# Patient Record
Sex: FEMALE | Race: WHITE | NOT HISPANIC OR LATINO | Employment: PART TIME | URBAN - METROPOLITAN AREA
[De-identification: names, ages, dates, MRNs, and addresses within clinical notes are randomized per-mention and may not be internally consistent; named-entity substitution may affect disease eponyms.]

---

## 2024-01-30 ENCOUNTER — OFFICE VISIT (OUTPATIENT)
Dept: INTERNAL MEDICINE | Facility: CLINIC | Age: 39
End: 2024-01-30
Payer: MEDICAID

## 2024-01-30 ENCOUNTER — TELEPHONE (OUTPATIENT)
Dept: INTERNAL MEDICINE | Facility: CLINIC | Age: 39
End: 2024-01-30

## 2024-01-30 ENCOUNTER — HOSPITAL ENCOUNTER (OUTPATIENT)
Dept: RADIOLOGY | Facility: HOSPITAL | Age: 39
Discharge: HOME | End: 2024-01-30
Attending: NURSE PRACTITIONER
Payer: MEDICAID

## 2024-01-30 VITALS
RESPIRATION RATE: 18 BRPM | TEMPERATURE: 99 F | SYSTOLIC BLOOD PRESSURE: 116 MMHG | HEIGHT: 56 IN | WEIGHT: 144.63 LBS | HEART RATE: 70 BPM | DIASTOLIC BLOOD PRESSURE: 54 MMHG | BODY MASS INDEX: 32.53 KG/M2

## 2024-01-30 DIAGNOSIS — Z87.81 HISTORY OF TIBIAL FRACTURE: ICD-10-CM

## 2024-01-30 DIAGNOSIS — M79.605 LEFT LEG PAIN: ICD-10-CM

## 2024-01-30 DIAGNOSIS — M79.605 LEFT LEG PAIN: Primary | ICD-10-CM

## 2024-01-30 DIAGNOSIS — Z12.4 CERVICAL CANCER SCREENING: ICD-10-CM

## 2024-01-30 DIAGNOSIS — F17.210 CIGARETTE NICOTINE DEPENDENCE WITHOUT COMPLICATION: Chronic | ICD-10-CM

## 2024-01-30 DIAGNOSIS — H92.01 RIGHT EAR PAIN: ICD-10-CM

## 2024-01-30 DIAGNOSIS — Z00.00 WELLNESS EXAMINATION: ICD-10-CM

## 2024-01-30 PROCEDURE — 1159F MED LIST DOCD IN RCRD: CPT | Mod: CPTII,,, | Performed by: NURSE PRACTITIONER

## 2024-01-30 PROCEDURE — 99203 OFFICE O/P NEW LOW 30 MIN: CPT | Mod: 25,S$PBB,, | Performed by: NURSE PRACTITIONER

## 2024-01-30 PROCEDURE — 3078F DIAST BP <80 MM HG: CPT | Mod: CPTII,,, | Performed by: NURSE PRACTITIONER

## 2024-01-30 PROCEDURE — 99406 BEHAV CHNG SMOKING 3-10 MIN: CPT | Mod: S$PBB,,, | Performed by: NURSE PRACTITIONER

## 2024-01-30 PROCEDURE — 73590 X-RAY EXAM OF LOWER LEG: CPT | Mod: TC,LT

## 2024-01-30 PROCEDURE — 3074F SYST BP LT 130 MM HG: CPT | Mod: CPTII,,, | Performed by: NURSE PRACTITIONER

## 2024-01-30 PROCEDURE — 1160F RVW MEDS BY RX/DR IN RCRD: CPT | Mod: CPTII,,, | Performed by: NURSE PRACTITIONER

## 2024-01-30 PROCEDURE — 3008F BODY MASS INDEX DOCD: CPT | Mod: CPTII,,, | Performed by: NURSE PRACTITIONER

## 2024-01-30 PROCEDURE — 99205 OFFICE O/P NEW HI 60 MIN: CPT | Mod: PBBFAC | Performed by: NURSE PRACTITIONER

## 2024-01-30 RX ORDER — BUSPIRONE HYDROCHLORIDE 7.5 MG/1
7.5 TABLET ORAL 2 TIMES DAILY
Status: ON HOLD | COMMUNITY
Start: 2024-01-25 | End: 2024-03-13 | Stop reason: HOSPADM

## 2024-01-30 RX ORDER — ATOMOXETINE 40 MG/1
40 CAPSULE ORAL
Status: ON HOLD | COMMUNITY
Start: 2024-01-25 | End: 2024-03-13 | Stop reason: HOSPADM

## 2024-01-30 RX ORDER — FLUTICASONE PROPIONATE 50 MCG
1 SPRAY, SUSPENSION (ML) NASAL
Status: ON HOLD | COMMUNITY
Start: 2024-01-25 | End: 2024-03-13 | Stop reason: HOSPADM

## 2024-01-30 RX ORDER — CITALOPRAM 20 MG/1
20 TABLET, FILM COATED ORAL
Status: ON HOLD | COMMUNITY
Start: 2024-01-25 | End: 2024-03-13 | Stop reason: HOSPADM

## 2024-01-30 NOTE — PROGRESS NOTES
Patient ID: [id number]     Chief Complaint: Establish Care (C/O right ear pain , burning for 1 month.)    HPI:     Mili Baker is a 38 y.o. female with diagnosis of Hx of Left Tibia Fx Repair with Jonathon/Screws (07/2023), Hx of Drug Use. Patient seen in clinic today to establish care.   Patient recently moved to [place] from [place]. Patient currently residing in a sober living home.   Patient had a left tibia fracture required jonathon/screw that was performed at [facility] in [place]. Patient has been experiencing swelling/pain to left leg x1 month. States takes Tylenol for pain with relief. Will try to obtain medical records from [facility].   Patient also states right ear pain. Patient denies drainage, change in hearing. Patient describes pain as sharp. Patient currently using Flonase with no relief.   Patient denies any other acute complaints.     Patient followed by Mental Health Provider, Juanis MENCHACA, at [place].     Review of patient's allergies indicates:  No Known Allergies    Breast Cancer Screening: deferred due to age  Cervical Cancer Screening: GYN referral ordered  Colorectal Cancer Screening: deferred due to age  Diabetic Eye Exam: N/A  Diabetic Foot Exam: N/A  Lung Cancer Screening: deferred due to age  Prostate Cancer Screening: N/A  AAA Screening: N/A  Osteoporosis Screening: deferred due to age  Medicare Wellness: N/A  Immunizations:   There is no immunization history on file for this patient.    Past Surgical History:   Procedure Laterality Date    leg Left      family history includes Asthma in her daughter and son; Kidney disease in her mother; No Known Problems in her father, maternal grandfather, maternal grandmother, paternal grandfather, and paternal grandmother.    Social History     Socioeconomic History    Marital status: Single    Number of children: 3   Tobacco Use    Smoking status: Every Day     Current packs/day: 0.50     Average packs/day: 0.5 packs/day for 20.1 years  (10.0 ttl pk-yrs)     Types: Cigarettes     Start date: 2004    Smokeless tobacco: Never   Substance and Sexual Activity    Alcohol use: Not Currently    Drug use: Not Currently     Types: Methamphetamines     Comment: Meth in past    Sexual activity: Not Currently     Partners: Male     Social Determinants of Health     Financial Resource Strain: High Risk (1/30/2024)    Overall Financial Resource Strain (CARDIA)     Difficulty of Paying Living Expenses: Very hard   Food Insecurity: No Food Insecurity (1/30/2024)    Hunger Vital Sign     Worried About Running Out of Food in the Last Year: Never true     Ran Out of Food in the Last Year: Never true   Transportation Needs: Unmet Transportation Needs (1/30/2024)    PRAPARE - Transportation     Lack of Transportation (Medical): Yes     Lack of Transportation (Non-Medical): Yes   Physical Activity: Inactive (1/30/2024)    Exercise Vital Sign     Days of Exercise per Week: 0 days     Minutes of Exercise per Session: 0 min   Stress: Stress Concern Present (1/30/2024)    [employer] [place] [employer] - Occupational Stress Questionnaire     Feeling of Stress : To some extent   Social Connections: Socially Isolated (1/30/2024)    Social Connection and Isolation Panel [NHANES]     Frequency of Communication with Friends and Family: Twice a week     Frequency of Social Gatherings with Friends and Family: Never     Attends [employer] Services: More than 4 times per year     Active Member of Clubs or Organizations: No     Attends Club or Organization Meetings: Never     Marital Status: Never    Housing Stability: High Risk (1/30/2024)    Housing Stability Vital Sign     Unable to Pay for Housing in the Last Year: Yes     Number of Places Lived in the Last Year: 2     Unstable Housing in the Last Year: No     Current Outpatient Medications   Medication Instructions    atomoxetine (STRATTERA) 40 mg, Oral    busPIRone (BUSPAR) 7.5 mg, Oral, 2 times daily     "citalopram (CELEXA) 20 mg, Oral    fluticasone propionate (FLONASE) 50 mcg/actuation nasal spray 1 spray, Each Nostril       Subjective:     Review of Systems   Constitutional: Negative.    HENT:  Positive for ear pain.    Eyes: Negative.    Respiratory: Negative.     Cardiovascular: Negative.    Gastrointestinal: Negative.    Endocrine: Negative.    Genitourinary: Negative.    Musculoskeletal:  Positive for arthralgias.   Skin: Negative.    Allergic/Immunologic: Negative.    Neurological: Negative.    Hematological: Negative.    Psychiatric/Behavioral: Negative.         Objective:     Visit Vitals  BP (!) 116/54 (BP Location: Right arm, Patient Position: Sitting, BP Method: Medium (Automatic))   Pulse 70   Temp 98.5 °F (36.9 °C) (Oral)   Resp 18   Ht 4' 8" (1.422 m)   Wt 65.6 kg (144 lb 9.6 oz)   BMI 32.42 kg/m²       Physical Exam  Vitals reviewed.   Constitutional:       Appearance: Normal appearance.   HENT:      Head: Normocephalic and atraumatic.      Right Ear: Tympanic membrane, ear canal and external ear normal. There is no impacted cerumen.      Left Ear: Tympanic membrane, ear canal and external ear normal. There is no impacted cerumen.      Mouth/Throat:      Mouth: Mucous membranes are moist.      Pharynx: Oropharynx is clear.   Eyes:      Extraocular Movements: Extraocular movements intact.      Conjunctiva/sclera: Conjunctivae normal.      Pupils: Pupils are equal, round, and reactive to light.   Cardiovascular:      Rate and Rhythm: Normal rate and regular rhythm.      Heart sounds: Normal heart sounds.   Pulmonary:      Effort: Pulmonary effort is normal.      Breath sounds: Normal breath sounds.   Abdominal:      General: Bowel sounds are normal.   Musculoskeletal:         General: Normal range of motion.      Cervical back: Normal range of motion.   Skin:     General: Skin is warm and dry.   Neurological:      Mental Status: She is alert and oriented to person, place, and time.   Psychiatric:    " "     Mood and Affect: Mood normal.         Behavior: Behavior normal.       Labs Reviewed:     Hematology:  No results found for: "WBC", "HGB", "HCT", "PLT"    Chemistry:  No results found for: "NA", "K", "CHLORIDE", "BUN", "CREATININE", "EGFRNORACEVR", "GLUCOSE", "CALCIUM", "ALKPHOS", "LABPROT", "ALBUMIN", "BILIDIR", "IBILI", "AST", "ALT", "MG", "PHOS", "[id number]"     No results found for: "HGBA1C", "MICROALBCREA"     Lipid Panel:  No results found for: "CHOL", "HDL", "LDL", "TRIG", "TOTALCHOLEST"     Thyroid:  No results found for: "TSH", "T4FREE", "T3FREE"     Urine:  No results found for: "COLORUA", "APPEARANCEUA", "SGUA", "PHUA", "PROTEINUA", "GLUCOSEUA", "KETONESUA", "BLOODUA", "NITRITESUA", "LEUKOCYTESUR", "RBCUA", "WBCUA", "BACTERIA", "SQEPUA", "HYALINECASTS", "CREATRANDUR", "PROTEINURINE", "UPROTCREA"     Assessment:       ICD-10-CM ICD-9-CM   1. Left leg pain  M79.605 729.5   2. History of tibial fracture  Z87.81 V15.51   3. Right ear pain  H92.01 388.70   4. Cigarette nicotine dependence without complication  F17.210 305.1   5. Wellness examination  Z00.00 V70.0   6. Cervical cancer screening  Z12.4 V76.2        Plan:     1. Left leg pain  - Ambulatory referral/consult to Orthopedics; Future  - X-Ray Tibia Fibula 2 View Left; Future    2. History of tibial fracture  - Ambulatory referral/consult to Orthopedics; Future  - X-Ray Tibia Fibula 2 View Left; Future    3. Right ear pain  - Ambulatory referral/consult to ENT; Future    4. Cigarette nicotine dependence without complication  Smoking cessation education provided, encouraged. Informed of smoking cessation program. Patient refused smoking cessation at this time. I spent 3 minutes discussing smoking cessation with patient.     5. Wellness examination  Labs ordered  - CBC Auto Differential; Future  - Comprehensive Metabolic Panel; Future  - Hemoglobin A1C; Future  - Lipid Panel; Future  - Urinalysis; Future  - TSH; Future    6. Cervical cancer " screening  - Ambulatory referral/consult to Gynecology; Future      Follow up in about 2 weeks (around 2/13/2024) for Labs. In addition to their scheduled follow up, the patient has also been instructed to follow up on as needed basis.     Vangie King, DENNISP

## 2024-01-30 NOTE — TELEPHONE ENCOUNTER
----- Message from NIKOLAI Dumas sent at 1/30/2024  8:47 AM CST -----  Please obtain medical records from [facility] (surgery for tibia fracture) in 07/2023 and Labs done at [facility] in [place], [place]. Release of info signed.

## 2024-03-09 ENCOUNTER — HOSPITAL ENCOUNTER (EMERGENCY)
Facility: HOSPITAL | Age: 39
Discharge: PSYCHIATRIC OR SUBSTANCE USE FACILITY | End: 2024-03-10
Attending: STUDENT IN AN ORGANIZED HEALTH CARE EDUCATION/TRAINING PROGRAM
Payer: MEDICAID

## 2024-03-09 DIAGNOSIS — T65.92XA INGESTION OF SUBSTANCE, INTENTIONAL SELF-HARM, INITIAL ENCOUNTER: ICD-10-CM

## 2024-03-09 DIAGNOSIS — R45.851 SUICIDAL IDEATION: ICD-10-CM

## 2024-03-09 DIAGNOSIS — Z00.8 MEDICAL CLEARANCE FOR PSYCHIATRIC ADMISSION: Primary | ICD-10-CM

## 2024-03-09 DIAGNOSIS — Z00.8 ENCOUNTER FOR PSYCHOLOGICAL EVALUATION: ICD-10-CM

## 2024-03-09 LAB
ALBUMIN SERPL-MCNC: 4.4 G/DL (ref 3.5–5)
ALBUMIN/GLOB SERPL: 1.6 RATIO (ref 1.1–2)
ALP SERPL-CCNC: 70 UNIT/L (ref 40–150)
ALT SERPL-CCNC: 13 UNIT/L (ref 0–55)
AMPHET UR QL SCN: NEGATIVE
APAP SERPL-MCNC: <17.4 UG/ML (ref 17.4–30)
APPEARANCE UR: ABNORMAL
AST SERPL-CCNC: 18 UNIT/L (ref 5–34)
B-HCG SERPL QL: NEGATIVE
BACTERIA #/AREA URNS AUTO: ABNORMAL /HPF
BARBITURATE SCN PRESENT UR: NEGATIVE
BASOPHILS # BLD AUTO: 0.13 X10(3)/MCL
BASOPHILS NFR BLD AUTO: 1.3 %
BENZODIAZ UR QL SCN: NEGATIVE
BILIRUB SERPL-MCNC: 0.4 MG/DL
BILIRUB UR QL STRIP.AUTO: NEGATIVE
BUN SERPL-MCNC: 5.8 MG/DL (ref 7–18.7)
CALCIUM SERPL-MCNC: 9 MG/DL (ref 8.4–10.2)
CANNABINOIDS UR QL SCN: NEGATIVE
CHLORIDE SERPL-SCNC: 102 MMOL/L (ref 98–107)
CO2 SERPL-SCNC: 21 MMOL/L (ref 22–29)
COCAINE UR QL SCN: NEGATIVE
COLOR UR AUTO: ABNORMAL
CREAT SERPL-MCNC: 0.67 MG/DL (ref 0.55–1.02)
EOSINOPHIL # BLD AUTO: 0.1 X10(3)/MCL (ref 0–0.9)
EOSINOPHIL NFR BLD AUTO: 1 %
ERYTHROCYTE [DISTWIDTH] IN BLOOD BY AUTOMATED COUNT: 12.5 % (ref 11.5–17)
ETHANOL SERPL-MCNC: <10 MG/DL
FENTANYL UR QL SCN: POSITIVE
GFR SERPLBLD CREATININE-BSD FMLA CKD-EPI: >60 MLS/MIN/1.73/M2
GLOBULIN SER-MCNC: 2.7 GM/DL (ref 2.4–3.5)
GLUCOSE SERPL-MCNC: 94 MG/DL (ref 74–100)
GLUCOSE UR QL STRIP.AUTO: NORMAL
HCT VFR BLD AUTO: 41.1 % (ref 37–47)
HGB BLD-MCNC: 14 G/DL (ref 12–16)
HYALINE CASTS #/AREA URNS LPF: ABNORMAL /LPF
IMM GRANULOCYTES # BLD AUTO: 0.05 X10(3)/MCL (ref 0–0.04)
IMM GRANULOCYTES NFR BLD AUTO: 0.5 %
KETONES UR QL STRIP.AUTO: NEGATIVE
LEUKOCYTE ESTERASE UR QL STRIP.AUTO: NEGATIVE
LYMPHOCYTES # BLD AUTO: 2.14 X10(3)/MCL (ref 0.6–4.6)
LYMPHOCYTES NFR BLD AUTO: 21 %
MAGNESIUM SERPL-MCNC: 1.7 MG/DL (ref 1.6–2.6)
MCH RBC QN AUTO: 31.9 PG (ref 27–31)
MCHC RBC AUTO-ENTMCNC: 34.1 G/DL (ref 33–36)
MCV RBC AUTO: 93.6 FL (ref 80–94)
MDMA UR QL SCN: NEGATIVE
MONOCYTES # BLD AUTO: 0.8 X10(3)/MCL (ref 0.1–1.3)
MONOCYTES NFR BLD AUTO: 7.8 %
MUCOUS THREADS URNS QL MICRO: ABNORMAL /LPF
NEUTROPHILS # BLD AUTO: 6.99 X10(3)/MCL (ref 2.1–9.2)
NEUTROPHILS NFR BLD AUTO: 68.4 %
NITRITE UR QL STRIP.AUTO: NEGATIVE
NRBC BLD AUTO-RTO: 0 %
OPIATES UR QL SCN: NEGATIVE
PCP UR QL: NEGATIVE
PH UR STRIP.AUTO: 5.5 [PH]
PH UR: 5.5 [PH] (ref 3–11)
PLATELET # BLD AUTO: 284 X10(3)/MCL (ref 130–400)
PMV BLD AUTO: 10.4 FL (ref 7.4–10.4)
POTASSIUM SERPL-SCNC: 4 MMOL/L (ref 3.5–5.1)
PROT SERPL-MCNC: 7.1 GM/DL (ref 6.4–8.3)
PROT UR QL STRIP.AUTO: ABNORMAL
RBC # BLD AUTO: 4.39 X10(6)/MCL (ref 4.2–5.4)
RBC #/AREA URNS AUTO: ABNORMAL /HPF
RBC UR QL AUTO: ABNORMAL
SARS-COV-2 RDRP RESP QL NAA+PROBE: NEGATIVE
SODIUM SERPL-SCNC: 136 MMOL/L (ref 136–145)
SP GR UR STRIP.AUTO: 1.01 (ref 1–1.03)
SPECIFIC GRAVITY, URINE AUTO (.000) (OHS): 1.01 (ref 1–1.03)
SQUAMOUS #/AREA URNS LPF: ABNORMAL /HPF
TSH SERPL-ACNC: 2.39 UIU/ML (ref 0.35–4.94)
UROBILINOGEN UR STRIP-ACNC: 2
WBC # SPEC AUTO: 10.21 X10(3)/MCL (ref 4.5–11.5)
WBC #/AREA URNS AUTO: ABNORMAL /HPF

## 2024-03-09 PROCEDURE — 80307 DRUG TEST PRSMV CHEM ANLYZR: CPT | Performed by: STUDENT IN AN ORGANIZED HEALTH CARE EDUCATION/TRAINING PROGRAM

## 2024-03-09 PROCEDURE — 25000003 PHARM REV CODE 250: Performed by: STUDENT IN AN ORGANIZED HEALTH CARE EDUCATION/TRAINING PROGRAM

## 2024-03-09 PROCEDURE — 63600175 PHARM REV CODE 636 W HCPCS: Performed by: STUDENT IN AN ORGANIZED HEALTH CARE EDUCATION/TRAINING PROGRAM

## 2024-03-09 PROCEDURE — 81025 URINE PREGNANCY TEST: CPT | Performed by: STUDENT IN AN ORGANIZED HEALTH CARE EDUCATION/TRAINING PROGRAM

## 2024-03-09 PROCEDURE — 81001 URINALYSIS AUTO W/SCOPE: CPT | Mod: XB | Performed by: STUDENT IN AN ORGANIZED HEALTH CARE EDUCATION/TRAINING PROGRAM

## 2024-03-09 PROCEDURE — 87635 SARS-COV-2 COVID-19 AMP PRB: CPT | Performed by: STUDENT IN AN ORGANIZED HEALTH CARE EDUCATION/TRAINING PROGRAM

## 2024-03-09 PROCEDURE — 99285 EMERGENCY DEPT VISIT HI MDM: CPT | Mod: 25

## 2024-03-09 PROCEDURE — 80053 COMPREHEN METABOLIC PANEL: CPT | Performed by: STUDENT IN AN ORGANIZED HEALTH CARE EDUCATION/TRAINING PROGRAM

## 2024-03-09 PROCEDURE — 80143 DRUG ASSAY ACETAMINOPHEN: CPT | Performed by: STUDENT IN AN ORGANIZED HEALTH CARE EDUCATION/TRAINING PROGRAM

## 2024-03-09 PROCEDURE — 82077 ASSAY SPEC XCP UR&BREATH IA: CPT | Performed by: STUDENT IN AN ORGANIZED HEALTH CARE EDUCATION/TRAINING PROGRAM

## 2024-03-09 PROCEDURE — 93005 ELECTROCARDIOGRAM TRACING: CPT

## 2024-03-09 PROCEDURE — 96366 THER/PROPH/DIAG IV INF ADDON: CPT

## 2024-03-09 PROCEDURE — 85025 COMPLETE CBC W/AUTO DIFF WBC: CPT | Performed by: STUDENT IN AN ORGANIZED HEALTH CARE EDUCATION/TRAINING PROGRAM

## 2024-03-09 PROCEDURE — 84443 ASSAY THYROID STIM HORMONE: CPT | Performed by: STUDENT IN AN ORGANIZED HEALTH CARE EDUCATION/TRAINING PROGRAM

## 2024-03-09 PROCEDURE — 96365 THER/PROPH/DIAG IV INF INIT: CPT

## 2024-03-09 PROCEDURE — 83735 ASSAY OF MAGNESIUM: CPT | Performed by: STUDENT IN AN ORGANIZED HEALTH CARE EDUCATION/TRAINING PROGRAM

## 2024-03-09 RX ORDER — MAGNESIUM SULFATE HEPTAHYDRATE 40 MG/ML
2 INJECTION, SOLUTION INTRAVENOUS
Status: COMPLETED | OUTPATIENT
Start: 2024-03-09 | End: 2024-03-09

## 2024-03-09 RX ADMIN — MAGNESIUM SULFATE HEPTAHYDRATE 2 G: 40 INJECTION, SOLUTION INTRAVENOUS at 05:03

## 2024-03-09 RX ADMIN — SODIUM CHLORIDE 1000 ML: 9 INJECTION, SOLUTION INTRAVENOUS at 06:03

## 2024-03-09 NOTE — ED PROVIDER NOTES
"Encounter Date: 3/9/2024    SCRIBE #1 NOTE: I, Lubna Reed, am scribing for, and in the presence of,  Carlitos Oswald MD. I have scribed the following portions of the note - Other sections scribed: HPI, ROS, PE, MDM.       History     Chief Complaint   Patient presents with    Ingestion     Pt. Reports taking 1-2 40 mg Strattera, 1-2 30mg Citalopram, 1-2 7.5mg Buspar, 1-2 150mg Trazodone 1 hour ago. Pt. Denies SI, however told EMS was trying to hurt herself "in a way." Awaiting admit to [facility].      Patient is a 38 year old female presents to ED, via EMS, for suicide ideation by attempted overdose.  Pt says that she took 1-3 Strattera, 1-2 trazodone, and 2 citalopram. She says that she wants to "just take my own self out." Denies any HI.    PEC initiated at 1445.    The history is provided by the patient. No  was used.     Review of patient's allergies indicates:  No Known Allergies  No past medical history on file.  Past Surgical History:   Procedure Laterality Date    leg Left      Family History   Problem Relation Age of Onset    Kidney disease Mother     No Known Problems Father     Asthma Daughter     Asthma Son     No Known Problems Maternal Grandmother     No Known Problems Maternal Grandfather     No Known Problems Paternal Grandmother     No Known Problems Paternal Grandfather      Social History     Tobacco Use    Smoking status: Every Day     Current packs/day: 0.50     Average packs/day: 0.5 packs/day for 20.2 years (10.1 ttl pk-yrs)     Types: Cigarettes     Start date: 2004    Smokeless tobacco: Never   Substance Use Topics    Alcohol use: Not Currently    Drug use: Not Currently     Types: Methamphetamines     Comment: Meth in past     Review of Systems   Constitutional:  Negative for fever.   HENT:  Negative for sore throat.    Eyes:  Negative for visual disturbance.   Respiratory:  Negative for shortness of breath.    Cardiovascular:  Negative for chest pain. "   Gastrointestinal:  Negative for abdominal pain.   Genitourinary:  Negative for dysuria.   Musculoskeletal:  Negative for joint swelling.   Skin:  Negative for rash.   Neurological:  Negative for weakness.   Psychiatric/Behavioral:  Positive for suicidal ideas. Negative for confusion.        Physical Exam     Initial Vitals [03/09/24 1410]   BP Pulse Resp Temp SpO2   117/76 76 16 98.4 °F (36.9 °C) 99 %      MAP       --         Physical Exam    Nursing note and vitals reviewed.  Constitutional: She appears well-developed and well-nourished.   HENT:   Head: Normocephalic and atraumatic.   Eyes: EOM are normal. Pupils are equal, round, and reactive to light.   Neck:   Normal range of motion.  Cardiovascular:  Normal rate, regular rhythm, normal heart sounds and intact distal pulses.           No murmur heard.  Pulmonary/Chest: Breath sounds normal. No respiratory distress. She has no wheezes. She has no rales.   Abdominal: Abdomen is soft. She exhibits no distension. There is no abdominal tenderness. There is no rebound.   Musculoskeletal:         General: No tenderness or edema. Normal range of motion.      Cervical back: Normal range of motion.     Neurological: She is alert. She has normal strength. No cranial nerve deficit. GCS score is 15. GCS eye subscore is 4. GCS verbal subscore is 5. GCS motor subscore is 6.   Skin: Skin is warm and dry. Capillary refill takes less than 2 seconds. No rash noted. No erythema.   Psychiatric: Her speech is delayed. She exhibits a depressed mood. She expresses suicidal ideation. She expresses no homicidal ideation. She expresses suicidal plans. She expresses no homicidal plans.         ED Course   Procedures  Labs Reviewed   COMPREHENSIVE METABOLIC PANEL - Abnormal; Notable for the following components:       Result Value    Carbon Dioxide 21 (*)     Blood Urea Nitrogen 5.8 (*)     All other components within normal limits   URINALYSIS, REFLEX TO URINE CULTURE - Abnormal;  Notable for the following components:    Appearance, UA Turbid (*)     Protein, UA Trace (*)     Blood, UA 2+ (*)     Urobilinogen, UA 2.0 (*)     Squamous Epithelial Cells, UA Occasional (*)     Mucous, UA Trace (*)     Hyaline Casts, UA 6-10 (*)     All other components within normal limits   DRUG SCREEN, URINE (BEAKER) - Abnormal; Notable for the following components:    Fentanyl, Urine Positive (*)     All other components within normal limits    Narrative:     Cut off concentrations:    Amphetamines - 1000 ng/ml  Barbiturates - 200 ng/ml  Benzodiazepine - 200 ng/ml  Cannabinoids (THC) - 50 ng/ml  Cocaine - 300 ng/ml  Fentanyl - 1.0 ng/ml  MDMA - 500 ng/ml  Opiates - 300 ng/ml   Phencyclidine (PCP) - 25 ng/ml    Specimen submitted for drug analysis and tested for pH and specific gravity in order to evaluate sample integrity. Suspect tampering if specific gravity is <1.003 and/or pH is not within the range of 4.5 - 8.0  False negatives may result form substances such as bleach added to urine.  False positives may result for the presence of a substance with similar chemical structure to the drug or its metabolite.    This test provides only a PRELIMINARY analytical test result. A more specific alternate chemical method must be used in order to obtain a confirmed analytical result. Gas chromatography/mass spectrometry (GC/MS) is the preferred confirmatory method. Other chemical confirmation methods are available. Clinical consideration and professional judgement should be applied to any drug of abuse test result, particularly when preliminary positive results are used.    Positive results will be confirmed only at the physicians request. Unconfirmed screening results are to be used only for medical purposes (treatment).        ACETAMINOPHEN LEVEL - Abnormal; Notable for the following components:    Acetaminophen Level <17.4 (*)     All other components within normal limits   CBC WITH DIFFERENTIAL - Abnormal;  Notable for the following components:    MCH 31.9 (*)     IG# 0.05 (*)     All other components within normal limits   TSH - Normal   ALCOHOL,MEDICAL (ETHANOL) - Normal   SARS-COV-2 RNA AMPLIFICATION, QUAL - Normal    Narrative:     The IDNOW COVID-19 assay is a rapid molecular in vitro diagnostic test utilizing an isothermal nucleic acid amplification technology intended for the qualitative detection of nucleic acid from the SARS-CoV-2 viral RNA in direct nasal, nasopharyngeal or throat swabs from individuals who are suspected of COVID-19 by their healthcare provider.   MAGNESIUM - Normal   CBC W/ AUTO DIFFERENTIAL    Narrative:     The following orders were created for panel order CBC auto differential.  Procedure                               Abnormality         Status                     ---------                               -----------         ------                     CBC with Differential[[id number]]       Abnormal            Final result                 Please view results for these tests on the individual orders.   SARS-COV-2 RNA AMPLIFICATION, QUAL     EKG Readings: (Independently Interpreted)   Normal sinus rhythm.  Rate of 97.  Prolonged QT with a QTC of 482.  No STEMI.  Time of 1644.        Imaging Results    None          Medications   magnesium sulfate 2g in water 50mL IVPB (premix) (2 g Intravenous New Bag 3/9/24 1756)   sodium chloride 0.9% bolus 1,000 mL 1,000 mL (1,000 mLs Intravenous New Bag 3/9/24 1812)     Medical Decision Making  Problems Addressed:  Encounter for psychological evaluation: acute illness or injury that poses a threat to life or bodily functions  Ingestion of substance, intentional self-harm, initial encounter: acute illness or injury that poses a threat to life or bodily functions  Medical clearance for psychiatric admission: acute illness or injury that poses a threat to life or bodily functions  Suicidal ideation: acute illness or injury that poses a threat to life or  bodily functions    Amount and/or Complexity of Data Reviewed  External Data Reviewed: notes.     Details: Reviewed old records, history of previous orthopedic injury  Labs: ordered.  ECG/medicine tests: ordered and independent interpretation performed.    Risk  Prescription drug management.  Parenteral controlled substances.  Decision regarding hospitalization.  Diagnosis or treatment significantly limited by social determinants of health.            Scribe Attestation:   Scribe #1: I performed the above scribed service and the documentation accurately describes the services I performed. I attest to the accuracy of the note.    Attending Attestation:           Physician Attestation for Scribe:  Physician Attestation Statement for Scribe #1: I, Carlitos Oswald MD, reviewed documentation, as scribed by Lubna Reed in my presence, and it is both accurate and complete.             ED Course as of 03/09/24 1932   Sat Mar 09, 2024   1900 Discussed with poison control.  [RP]      ED Course User Index  [RP] Carlitos Oswald MD       Medically cleared for psychiatry placement: 3/9/2024  7:30 PM       Medical Decision Making:   History:   I obtained history from: someone other than patient.       <> Summary of History: Collateral from paramedics.  Old Medical Records: I decided to obtain old medical records.  Old Records Summarized: records from clinic visits, records from previous admission(s) and records from another hospital.       <> Summary of Records: Reviewed old records.  Initial Assessment:   Psych eval  Differential Diagnosis:   suicidal ideations, homicidal ideations, auditory or visual hallucinations, drug/etoh intoxication, bipolar disorder, schizophrenia, schizoaffective, delusional disorder, major depressive disorder, PTSD, substance induced mood disorder, violent behavior, suicidal attempt, non-psychiatric medical illness, malingering      Clinical Tests:   Lab Tests: Reviewed and Ordered  Medical  Tests: Ordered and Reviewed  ED Management:  Patient is a 38-year-old female presents to the emergency department for intentional overdose, suicide ideation with attempt.  See HPI.  See physical exam.  Discussed case with poison control the patient's arrival.  She adjusted some oral medications.  She was observed for several hours for any agitation, seizure-like activity.  No evidence of agitation, tachycardic, seizure-like activity.  She was given IV fluids, magnesium, given that the medication she took can prolonged QTC.  On reassessment patient resting comfortably, no acute distress.  No tachycardic, diaphoresis, agitation noted.  Discussed with poison control, medically clear.  All results discussed with the patient.  Counseled extensively.  Patient has been placed under pec as she was a danger to herself.  Hemodynamically stable for transfer for continued psychiatric evaluation and care.             Clinical Impression:  Final diagnoses:  [Z00.8] Encounter for psychological evaluation  [Z00.8] Medical clearance for psychiatric admission (Primary)  [R45.851] Suicidal ideation  [T65.92XA] Ingestion of substance, intentional self-harm, initial encounter          ED Disposition Condition    Transfer to Psych Facility Stable          ED Prescriptions    None       Follow-up Information    None          Carlitos Oswald MD  03/09/24 1362

## 2024-03-10 ENCOUNTER — HOSPITAL ENCOUNTER (INPATIENT)
Facility: HOSPITAL | Age: 39
LOS: 4 days | Discharge: HOME | DRG: 885 | End: 2024-03-14
Attending: PSYCHIATRY & NEUROLOGY | Admitting: PSYCHIATRY & NEUROLOGY
Payer: MEDICAID

## 2024-03-10 VITALS
BODY MASS INDEX: 33.74 KG/M2 | HEART RATE: 87 BPM | WEIGHT: 150 LBS | HEIGHT: 56 IN | DIASTOLIC BLOOD PRESSURE: 58 MMHG | OXYGEN SATURATION: 96 % | TEMPERATURE: 99 F | RESPIRATION RATE: 16 BRPM | SYSTOLIC BLOOD PRESSURE: 119 MMHG

## 2024-03-10 DIAGNOSIS — F32.9 MDD (MAJOR DEPRESSIVE DISORDER): ICD-10-CM

## 2024-03-10 PROBLEM — R45.851 SUICIDAL IDEATION: Status: ACTIVE | Noted: 2024-03-10

## 2024-03-10 PROBLEM — F15.20 METHAMPHETAMINE USE DISORDER, SEVERE: Status: ACTIVE | Noted: 2024-03-10

## 2024-03-10 PROBLEM — F32.2 CURRENT SEVERE EPISODE OF MAJOR DEPRESSIVE DISORDER WITHOUT PSYCHOTIC FEATURES WITHOUT PRIOR EPISODE: Status: ACTIVE | Noted: 2024-03-10

## 2024-03-10 PROCEDURE — 11400000 HC PSYCH PRIVATE ROOM

## 2024-03-10 PROCEDURE — S4991 NICOTINE PATCH NONLEGEND: HCPCS | Performed by: PSYCHIATRY & NEUROLOGY

## 2024-03-10 PROCEDURE — 25000003 PHARM REV CODE 250: Performed by: PSYCHIATRY & NEUROLOGY

## 2024-03-10 RX ORDER — OLANZAPINE 10 MG/1
10 TABLET, ORALLY DISINTEGRATING ORAL EVERY 8 HOURS PRN
Status: DISCONTINUED | OUTPATIENT
Start: 2024-03-10 | End: 2024-03-14 | Stop reason: HOSPADM

## 2024-03-10 RX ORDER — IBUPROFEN 400 MG/1
400 TABLET ORAL EVERY 6 HOURS PRN
Status: DISCONTINUED | OUTPATIENT
Start: 2024-03-10 | End: 2024-03-14 | Stop reason: HOSPADM

## 2024-03-10 RX ORDER — CLONIDINE HYDROCHLORIDE 0.1 MG/1
0.1 TABLET ORAL NIGHTLY
Status: DISCONTINUED | OUTPATIENT
Start: 2024-03-10 | End: 2024-03-14 | Stop reason: HOSPADM

## 2024-03-10 RX ORDER — ALUMINUM HYDROXIDE, MAGNESIUM HYDROXIDE, AND SIMETHICONE 1200; 120; 1200 MG/30ML; MG/30ML; MG/30ML
30 SUSPENSION ORAL EVERY 6 HOURS PRN
Status: DISCONTINUED | OUTPATIENT
Start: 2024-03-10 | End: 2024-03-14 | Stop reason: HOSPADM

## 2024-03-10 RX ORDER — TRAZODONE HYDROCHLORIDE 100 MG/1
100 TABLET ORAL NIGHTLY
Status: ON HOLD | COMMUNITY
End: 2024-03-13 | Stop reason: HOSPADM

## 2024-03-10 RX ORDER — BUPROPION HYDROCHLORIDE 150 MG/1
150 TABLET ORAL DAILY
Status: DISCONTINUED | OUTPATIENT
Start: 2024-03-11 | End: 2024-03-14 | Stop reason: HOSPADM

## 2024-03-10 RX ORDER — BUSPIRONE HYDROCHLORIDE 5 MG/1
5 TABLET ORAL 3 TIMES DAILY
Status: DISCONTINUED | OUTPATIENT
Start: 2024-03-10 | End: 2024-03-12

## 2024-03-10 RX ORDER — FLUTICASONE PROPIONATE 50 MCG
1 SPRAY, SUSPENSION (ML) NASAL DAILY
Status: DISCONTINUED | OUTPATIENT
Start: 2024-03-11 | End: 2024-03-14 | Stop reason: HOSPADM

## 2024-03-10 RX ORDER — ACETAMINOPHEN 325 MG/1
650 TABLET ORAL EVERY 6 HOURS PRN
Status: DISCONTINUED | OUTPATIENT
Start: 2024-03-10 | End: 2024-03-14 | Stop reason: HOSPADM

## 2024-03-10 RX ORDER — IBUPROFEN 200 MG
1 TABLET ORAL DAILY
Status: DISCONTINUED | OUTPATIENT
Start: 2024-03-10 | End: 2024-03-14 | Stop reason: HOSPADM

## 2024-03-10 RX ORDER — HYDROXYZINE PAMOATE 25 MG/1
50 CAPSULE ORAL EVERY 6 HOURS PRN
Status: DISCONTINUED | OUTPATIENT
Start: 2024-03-10 | End: 2024-03-14 | Stop reason: HOSPADM

## 2024-03-10 RX ADMIN — BUSPIRONE HYDROCHLORIDE 5 MG: 5 TABLET ORAL at 03:03

## 2024-03-10 RX ADMIN — NICOTINE 1 PATCH: 21 PATCH, EXTENDED RELEASE TRANSDERMAL at 03:03

## 2024-03-10 RX ADMIN — OLANZAPINE 10 MG: 10 TABLET, ORALLY DISINTEGRATING ORAL at 03:03

## 2024-03-10 RX ADMIN — CLONIDINE HYDROCHLORIDE 0.1 MG: 0.1 TABLET ORAL at 08:03

## 2024-03-10 RX ADMIN — BUSPIRONE HYDROCHLORIDE 5 MG: 5 TABLET ORAL at 08:03

## 2024-03-10 NOTE — SUBJECTIVE & OBJECTIVE
Patient History               Medical as of 3/10/2024       Past Medical History       Diagnosis Date Comments Source    Alcohol abuse -- -- Provider    History of psychiatric hospitalization -- -- Provider    Hx of psychiatric care -- -- Provider                          Surgical as of 3/10/2024       Past Surgical History       Procedure Laterality Date Comments Source    leg [Other] Left -- -- Provider                          Family as of 3/10/2024       Problem Relation Name Age of Onset Comments Source    Kidney disease Mother -- -- -- Provider    No Known Problems Father -- -- -- Provider    Asthma Daughter -- -- -- Provider    Asthma Son -- -- -- Provider    No Known Problems Maternal Grandmother -- -- -- Provider    No Known Problems Maternal Grandfather -- -- -- Provider    No Known Problems Paternal Grandmother -- -- -- Provider    No Known Problems Paternal Grandfather -- -- -- Provider                  Tobacco Use as of 3/10/2024       Smoking Status Smoking Start Date Last Attempt to Quit Current Packs/Day Average Packs/Day    Every Day 2004 -- 0.5 0.5 packs/day for 20.2 years (10.1 ttl pk-yrs)   Pack Year History   Packs/Day From To Years    0.5 2004 -- 20.2      Smokeless Status Smokeless Type Smokeless Quit Date    Never -- --      Source    Provider                  Alcohol Use as of 3/10/2024       Alcohol Use Drinks/Week Alcohol/Week Comments Source    Not Currently   -- -- Provider                  Drug Use as of 3/10/2024       Drug Use Types Frequency Comments Source    Not Currently  Methamphetamines -- Meth in past Provider                  Sexual Activity as of 3/10/2024       Sexually Active Birth Control Partners Comments Source    Not Currently -- Male -- Provider                  Activities of Daily Living as of 3/10/2024    None               Social Documentation as of 3/10/2024    Patient currently lives in a sober living home in [facility] with tentative individuals.   She states she does have a warrant for her arrest and he was [place] for a unpaid traffic find.    Patient has previously been incarcerated.    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  She does not want to return to school she was 3 children all in the custody of her mother.  Her family those in [facility].  Source: Provider               Occupational as of 3/10/2024    None               Socioeconomic as of 3/10/2024       Marital Status Spouse Name Number of Children Years Education Education Level Preferred Language Ethnicity Race Source    Single -- 3 -- -- English Not  or /a White Provider                  Pertinent History       Question Response Comments    Lives with -- --    Place in Birth Order -- --    Lives in other --    Number of Siblings -- --    Raised by biological parents --    Legal Involvement criminal litigation --    Childhood Trauma uneventful --    Criminal History of arrest and incarceration --    Financial Status employed --    Highest Level of Education unfinished college --    Does patient have access to a firearm? No --     Service No --    Primary Leisure Activity -- --    Spirituality -- --          Past Medical History:   Diagnosis Date    Alcohol abuse     History of psychiatric hospitalization     Hx of psychiatric care      Past Surgical History:   Procedure Laterality Date    leg Left      Family History       Problem Relation (Age of Onset)    Asthma Daughter, Son    Kidney disease Mother    No Known Problems Father, Maternal Grandmother, Maternal Grandfather, Paternal Grandmother, Paternal Grandfather          Tobacco Use    Smoking status: Every Day     Current packs/day: 0.50     Average packs/day: 0.5 packs/day for 20.2 years (10.1 ttl pk-yrs)     Types: Cigarettes     Start date: 2004    Smokeless tobacco: Never   Substance and Sexual Activity    Alcohol use: Not Currently    Drug use: Not Currently      Types: Methamphetamines     Comment: Meth in past    Sexual activity: Not Currently     Partners: Male     Review of patient's allergies indicates:  No Known Allergies    Current Facility-Administered Medications on File Prior to Encounter   Medication    [COMPLETED] magnesium sulfate 2g in water 50mL IVPB (premix)    [COMPLETED] sodium chloride 0.9% bolus 1,000 mL 1,000 mL     Current Outpatient Medications on File Prior to Encounter   Medication Sig    atomoxetine (STRATTERA) 40 MG capsule Take 40 mg by mouth.    busPIRone (BUSPAR) 7.5 MG tablet Take 7.5 mg by mouth 2 (two) times daily.    citalopram (CELEXA) 20 MG tablet Take 20 mg by mouth.    fluticasone propionate (FLONASE) 50 mcg/actuation nasal spray 1 spray by Each Nostril route.    traZODone (DESYREL) 100 MG tablet Take 100 mg by mouth every evening.     Psychotherapeutics (From admission, onward)      Start     Stop Route Frequency Ordered    03/11/24 0900  buPROPion TB24 tablet 150 mg         -- Oral Daily 03/10/24 1318    03/10/24 1500  busPIRone tablet 5 mg         -- Oral 3 times daily 03/10/24 1318    03/10/24 0343  OLANZapine zydis disintegrating tablet 10 mg         -- Oral Every 8 hours PRN 03/10/24 0343          Review of Systems   Psychiatric/Behavioral:  Positive for decreased concentration, dysphoric mood, sleep disturbance and suicidal ideas. The patient is nervous/anxious.    Strengths and Liabilities: Strength: Patient is expressive/articulate., Strength: Patient is motivated for change., Liability: Patient has no suport network.    Objective:     Vital Signs (Most Recent):  Temp: 98.2 °F (36.8 °C) (03/10/24 0706)  Pulse: 95 (03/10/24 0706)  Resp: 18 (03/10/24 0706)  BP: 106/66 (03/10/24 0706)  SpO2: 97 % (03/10/24 0706) Vital Signs (24h Range):  Temp:  [98.2 °F (36.8 °C)-98.5 °F (36.9 °C)] 98.2 °F (36.8 °C)  Pulse:  [] 95  Resp:  [12-20] 18  SpO2:  [94 %-98 %] 97 %  BP: ()/(55-66) 106/66           There is no height or weight  on file to calculate BMI.    No intake or output data in the 24 hours ending 03/10/24 1610       Physical Exam   Mental status examination:  38-year-old white female whose affect at this time was constricted.  Whose speech was with good articulation and execution.  His thought processes this time were linear and able to be tracked.  His thought content demonstrated no clear evidence of any auditory or visual hallucinations.  She made no statements to harm self.  She made no active statements to harm self but clearly had previous gestures and attempts to do so.  She did not show any evidence of auditory or visual hallucinations.  She was preoccupied with previous events in life events and past wrongs.  She had no current statements desires to harm others.  Her level intensity of paranoia was non-existent.  Insight and judgment deemed to be limited.  On cognitive evaluation she was alert and oriented to person, place, setting and time.  Immediate memory is 3/3 objects immediately.  2/3 objects 5 minutes.  Long-term memory appeared to be intact.  She can perform basic calculations serial threes.  Her fund of knowledge is deemed to be slightly above average.  She was adequately abstract.     Significant Labs: Last 72 Hours:   Recent Lab Results         03/09/24  1617   03/09/24  1509   03/09/24  1441        Phencyclidine     Negative       Albumin/Globulin Ratio   1.6         Acetaminophen Level   <17.4         Albumin   4.4         Alcohol, Serum   <10.0  Comment: This assay is performed for medical purposes only.         ALP   70         ALT   13         Amphetamines, Urine     Negative       Appearance, UA     Turbid       AST   18         Bacteria, UA     Trace       Barbituates, Urine     Negative       Baso #   0.13         Basophil %   1.3         Benzodiazepine, Urine     Negative       BILIRUBIN TOTAL   0.4         Bilirubin, UA     Negative       BUN   5.8         Calcium   9.0         Cannabinoids, Urine      Negative       Chloride   102         CO2   21         Cocaine, Urine     Negative       Color, UA     Light-Yellow       ID NOW COVID-19, (SENG) Negative           Creatinine   0.67         eGFR   >60         Eos #   0.10         Eos %   1.0         Fentanyl, Urine     Positive       Globulin, Total   2.7         Glucose   94         Glucose, UA     Normal       Hematocrit   41.1         Hemoglobin   14.0         Hyaline Casts, UA     6-10       Immature Grans (Abs)   0.05         Immature Granulocytes   0.5         Ketones, UA     Negative       Leukocyte Esterase, UA     Negative       Lymph #   2.14         LYMPH %   21.0         Magnesium    1.70         MCH   31.9         MCHC   34.1         MCV   93.6         MDMA, Urine     Negative       Mono #   0.80         Mono %   7.8         MPV   10.4         Mucous, UA     Trace       Neut #   6.99         Neut %   68.4         NITRITE UA     Negative       nRBC   0.0         Blood, UA     2+       Opiates, Urine     Negative       pH, UA     5.5       pH, Urine     5.5       Platelet Count   284         Potassium   4.0         hCG Qualitative, Urine     Negative       PROTEIN TOTAL   7.1         Protein, UA     Trace       RBC   4.39         RBC, UA     0-5       RDW   12.5         Sodium   136         Specific Gravity,UA     1.014       Specific Gravity, Urine Auto     1.014       Squamous Epithelial Cells, UA     Occasional       TSH   2.391         Urobilinogen, UA     2.0       WBC, UA     0-5       WBC   10.21                 Significant Imaging: None

## 2024-03-10 NOTE — ASSESSMENT & PLAN NOTE
Patient this time will be seen by this provider in terms of full evaluation for changes in current trials history of inattentiveness and low energy levels we will consider trials of Wellbutrin move away from trials of Celexa.  We will also go ahead and place patient on trials of Catapres for management overall sleep disturbance.  Patient this time clearly need of additional individuals to foster stabilization.  In conjunction with the use of the Catapres also will look to increase doses of BuSpar to 5 mg p.o. t.i.d. we will reassess.  Patient was clearly need ongoing services to foster recovery and stabilization..

## 2024-03-10 NOTE — NURSING
"Admission Note:    Mili Baker is a 38 y.o. female, : 1985, MRN: [id number], admitted on (Not on file) to [facility]) for Shiva Patterson MD with a diagnosis of MDD with SI  Opiod Use Disorder. Patient admitted on a status of Physician Emergency Certificate (PEC). Mili reports no known food or drug allergies.    Patient demonstrated an affect that was anxious, irritable, agitated, angry, and expansive. Patient demonstrated mood during assessment that was anxious. Patient had an appearance that was disheveled.  Patient endorses suicidal ideation. Patient endorses suicide plan. Patient endorses hallucinations.     Patient presented to the ER for ingestion of 1-2 40mg Strattera, 1-2 30mg Citalopram, 1-2 7.5mg Buspar, 1-2 150mg Trazadone,  1 hour prior to ER arrival. She stated that she wants to "just take my self out". (Per ER).   On arrival to [facility] patient very agitated and uncooperative this admission. Patient demanding to smoke, loud, sarcastic, hospital security called.  "You violated my federal rights not to smoke".  Patient signed "Theodore Cheese" on admission papers.  Staff asked her when was the last time she took her medication and she stated, "It's your job to figure it out."  Denied suicidal thoughts.  Stated she hears voices, "I hear myself in my on head, repetitious thoughts."   UDS: +Fentanyl  Security search completed.   Hx: Depression, ADHD.  Patient fed, then went to her room.  Q 15 minute safety checks initiated.       Metal detector screening performed via security personnel. The result of the scan was negative_______. Head-to-toe physical assessment completed with the following findings:  Nothing________ found upon body screen. A full skin assessment was performed. Justin skin appeared _WNL______.  Mili was oriented to unit, staff, peers, and room. Patient belongings/valuables stored in locked intake room cabinet and changes of clothing provided to " patient. Mili was placed on Q 15 min observations.

## 2024-03-10 NOTE — NURSING
Daily Nursing Note:      Behavior:    Patient (Mili Baker is a 38 y.o. female, : 1985, MRN: [id number]) demonstrating an affect that was sad, flat, and anxious. Mili demonstrating mood that is depressed and anxious. Mili had an appearance that was disheveled. Mili denies suicidal ideation. Mili denies suicide plan. Mili denies homicidal ideation. Mili denies hallucinations.    Mili's  temperature is 98.2 °F (36.8 °C). Her blood pressure is 106/66 and her pulse is 95. Her respiration is 18 and oxygen saturation is 97%.       Intervention:    Encourage Mili to perform self-hygiene, grooming, and changing of clothing. Monitor Mili's behavior and program compliance. Monitor Mili for suicidal ideation, homicidal ideation, sleep disturbance, and hallucinations. Encourage Mili to eat all portions of meals and assess for meal preferences. Monitor Mili for intake and output to ensure hydration. Notify the Physician for any medication refusal and any change in patient condition.      Response:    Mili verbalizes understands unit process and procedures. She has been somnolent most of the day after receiving Zyprexa Zydis 10mg @ 03:40 this morning for increased anxiety and agitation. She refused breakfast and lunch, but did eat 100% of 14:00 snack. She met with Dr. Patterson this afternoon for psyc evaluation.     Plan:     Continue to monitor per MD orders; maintain patient safety. Q 15 min safety checks with suicide precautions.

## 2024-03-10 NOTE — PLAN OF CARE
Problem: Adult Inpatient Plan of Care  Goal: Plan of Care Review  Outcome: Ongoing, Progressing  Goal: Patient-Specific Goal (Individualized)  Outcome: Ongoing, Progressing  Goal: Absence of Hospital-Acquired Illness or Injury  Outcome: Ongoing, Progressing  Goal: Optimal Comfort and Wellbeing  Outcome: Ongoing, Progressing  Goal: Readiness for Transition of Care  Outcome: Ongoing, Progressing     Problem: Suicide Risk  Goal: Absence of Self-Harm  Outcome: Ongoing, Progressing

## 2024-03-10 NOTE — ED NOTES
Received a call from [place], placement at [facility], accepted by Dr. Patterson [phone]

## 2024-03-10 NOTE — SUBJECTIVE & OBJECTIVE
No past medical history on file.    Past Surgical History:   Procedure Laterality Date    leg Left        Review of patient's allergies indicates:  No Known Allergies    Current Facility-Administered Medications on File Prior to Encounter   Medication    [COMPLETED] magnesium sulfate 2g in water 50mL IVPB (premix)    [COMPLETED] sodium chloride 0.9% bolus 1,000 mL 1,000 mL     Current Outpatient Medications on File Prior to Encounter   Medication Sig    atomoxetine (STRATTERA) 40 MG capsule Take 40 mg by mouth.    busPIRone (BUSPAR) 7.5 MG tablet Take 7.5 mg by mouth 2 (two) times daily.    citalopram (CELEXA) 20 MG tablet Take 20 mg by mouth.    fluticasone propionate (FLONASE) 50 mcg/actuation nasal spray 1 spray by Each Nostril route.    traZODone (DESYREL) 100 MG tablet Take 100 mg by mouth every evening.     Family History       Problem Relation (Age of Onset)    Asthma Daughter, Son    Kidney disease Mother    No Known Problems Father, Maternal Grandmother, Maternal Grandfather, Paternal Grandmother, Paternal Grandfather          Tobacco Use    Smoking status: Every Day     Current packs/day: 0.50     Average packs/day: 0.5 packs/day for 20.2 years (10.1 ttl pk-yrs)     Types: Cigarettes     Start date: 2004    Smokeless tobacco: Never   Substance and Sexual Activity    Alcohol use: Not Currently    Drug use: Not Currently     Types: Methamphetamines     Comment: Meth in past    Sexual activity: Not Currently     Partners: Male     Review of Systems   Constitutional: Negative.    HENT: Negative.     Eyes: Negative.    Respiratory: Negative.     Cardiovascular: Negative.    Gastrointestinal: Negative.    Endocrine: Negative.    Genitourinary: Negative.    Musculoskeletal: Negative.    Skin: Negative.    Allergic/Immunologic: Negative.    Neurological: Negative.    Hematological: Negative.    Psychiatric/Behavioral:  Positive for suicidal ideas.      Objective:     Vital Signs (Most Recent):  Temp: 98.2 °F (36.8  °C) (03/10/24 0706)  Pulse: 95 (03/10/24 0706)  Resp: 18 (03/10/24 0706)  BP: 106/66 (03/10/24 0706)  SpO2: 97 % (03/10/24 0706) Vital Signs (24h Range):  Temp:  [98.2 °F (36.8 °C)-98.5 °F (36.9 °C)] 98.2 °F (36.8 °C)  Pulse:  [] 95  Resp:  [12-20] 18  SpO2:  [94 %-99 %] 97 %  BP: ()/(55-76) 106/66        There is no height or weight on file to calculate BMI.     Physical Exam  Constitutional:       Appearance: Normal appearance. She is normal weight.   HENT:      Head: Normocephalic and atraumatic.      Nose: Nose normal.      Mouth/Throat:      Mouth: Mucous membranes are moist.      Pharynx: Oropharynx is clear.   Eyes:      Extraocular Movements: Extraocular movements intact and EOM normal.      Conjunctiva/sclera: Conjunctivae normal.      Pupils: Pupils are equal, round, and reactive to light.   Cardiovascular:      Rate and Rhythm: Normal rate and regular rhythm.      Pulses: Normal pulses.      Heart sounds: Normal heart sounds.   Pulmonary:      Effort: Pulmonary effort is normal.      Breath sounds: Normal breath sounds.   Abdominal:      General: Bowel sounds are normal.      Palpations: Abdomen is soft.   Musculoskeletal:         General: Normal range of motion.      Cervical back: Normal range of motion and neck supple.   Skin:     General: Skin is warm and dry.      Capillary Refill: Capillary refill takes 2 to 3 seconds.   Neurological:      General: No focal deficit present.      Mental Status: She is alert and oriented to person, place, and time. Mental status is at baseline.   Psychiatric:         Attention and Perception: Attention normal.         Mood and Affect: Mood normal.         Speech: Speech normal.         Behavior: Behavior normal. Behavior is cooperative.         Thought Content: Thought content includes suicidal ideation.         Cognition and Memory: Cognition and memory normal.         CRANIAL NERVES     CN I  cranial nerve I not tested    CN II   Visual fields full to  confrontation.     CN III, IV, VI   Pupils are equal, round, and reactive to light.  Extraocular motions are normal.   CN III: no CN III palsy  CN VI: no CN VI palsy    CN V   Facial sensation intact.     CN VII   Facial expression full, symmetric.     CN VIII   CN VIII normal.     CN IX, X   CN IX normal.   CN X normal.     CN XI   CN XI normal.     CN XII   CN XII normal.         Significant Labs: All pertinent labs within the past 24 hours have been reviewed.  BMP:   Recent Labs   Lab 03/09/24  1509      K 4.0   CO2 21*   BUN 5.8*   CREATININE 0.67   CALCIUM 9.0   MG 1.70     CBC:   Recent Labs   Lab 03/09/24  1509   WBC 10.21   HGB 14.0   HCT 41.1        CMP:   Recent Labs   Lab 03/09/24  1509      K 4.0   CO2 21*   BUN 5.8*   CREATININE 0.67   CALCIUM 9.0   ALBUMIN 4.4   BILITOT 0.4   ALKPHOS 70   AST 18   ALT 13     Magnesium:   Recent Labs   Lab 03/09/24  1509   MG 1.70       Significant Imaging: I have reviewed all pertinent imaging results/findings within the past 24 hours.

## 2024-03-10 NOTE — HPI
39 yo female admitted to [facility] for suicidal ideation and attempt.  She denies any N/V/D/C.  No fever/chills.  No chest pain/SOB.  Labs were stable but she was positive for Fentanyl which may be a false positive.  She does smoke tobacco.  She denies any other medical illnesses.  Hospitalist have been consulted for medical evaluation.

## 2024-03-10 NOTE — NURSING
Body audit done on admit. Has several tattoos to neck,back,stomach, right shoulder and arm, right foot and leg. Has old scar below left knee and scratches between breasts.

## 2024-03-10 NOTE — HPI
38-year-old white female who at this time was admitted to inpatient services here at [facility].  Patient was admitted after she presents with a history of relapsed with alcohol and gestures to self injure by mixed overdose while at sober living home.      Patient was apparently has been living in sober living for the past 5 months.  She states since living there she has done fairly well however most recently she was states there has been increased conflict in the home other women in his led her to the point that she just did not want to go on.  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  She states has a result is become increasingly more stressful overwhelming to her.    Patient was this time describes a history of ongoing difficulties in terms of depression mood particularly most recently has been started on trials of Celexa.  She also has Strattera for complaints of inattentiveness    Patient was evaluated at this time overall appropriate utilization of trials of medications patient was presents overall symptom complex at this time is characterized by the followin. Difficulty sleeping with most recent faces 100 mg of trazodone   2. feelings of low self-worth self-esteem   3. Feelings of hopelessness   .  Poor concentration  5.  Persistent dysphoria  6. Thoughts to self-harm with a he was despair and hopelessness stating that she just can not go on patient this time presents with a history of gesture.  7. Low energy levels   8. Increased desire to isolate and withdrawal.    Patient does have a long established history of use of substances going back to age 17.  Patient reports her 1st she was substance was cannabis and alcohol.  She states she has used throughout her adult life.  Patient this time reports a history of most recent usage of methamphetamines.  Earlier in life she did use opiate  compounds her most recent relapsed was focused around alcohol.    Patient denies any previous attempts to harm self.  She does have a courses presentation.    When questioned about issues of trauma earlier in life she denies.    When questioned about history of engaging into restless assaultive behavior towards others she denies.

## 2024-03-10 NOTE — H&P
[facility]  Psychiatry  History & Physical    Patient Name: Mili Baker  MRN: [id number]   Code Status: Full Code  Admission Date: 3/10/2024  Attending Physician: Shiva Patterson MD   Primary Care Provider: Vangie King FNP    Current Legal Status:  Patient is on a physician's emergency commitment    Patient information was obtained from patient and ER records.     Subjective:     Principal Problem:Current severe episode of major depressive disorder without psychotic features without prior episode    Chief Complaint:  I am just tired of everything     HPI: 38-year-old white female who at this time was admitted to inpatient services here at [facility].  Patient was admitted after she presents with a history of relapsed with alcohol and gestures to self injure by mixed overdose while at sober living home.      Patient was apparently has been living in sober living for the past 5 months.  She states since living there she has done fairly well however most recently she was states there has been increased conflict in the home other women in his led her to the point that she just did not want to go on.  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  She states has a result is become increasingly more stressful overwhelming to her.    Patient was this time describes a history of ongoing difficulties in terms of depression mood particularly most recently has been started on trials of Celexa.  She also has Strattera for complaints of inattentiveness    Patient was evaluated at this time overall appropriate utilization of trials of medications patient was presents overall symptom complex at this time is characterized by the followin. Difficulty sleeping with most recent faces 100 mg of trazodone   2. feelings of low self-worth self-esteem   3. Feelings of  hopelessness   .  Poor concentration  5.  Persistent dysphoria  6. Thoughts to self-harm with a he was despair and hopelessness stating that she just can not go on patient this time presents with a history of gesture.  7. Low energy levels   8. Increased desire to isolate and withdrawal.    Patient does have a long established history of use of substances going back to age 17.  Patient reports her 1st she was substance was cannabis and alcohol.  She states she has used throughout her adult life.  Patient this time reports a history of most recent usage of methamphetamines.  Earlier in life she did use opiate compounds her most recent relapsed was focused around alcohol.    Patient denies any previous attempts to harm self.  She does have a courses presentation.    When questioned about issues of trauma earlier in life she denies.    When questioned about history of engaging into restless assaultive behavior towards others she denies.           Patient History               Medical as of 3/10/2024       Past Medical History       Diagnosis Date Comments Source    Alcohol abuse -- -- Provider    History of psychiatric hospitalization -- -- Provider    Hx of psychiatric care -- -- Provider                          Surgical as of 3/10/2024       Past Surgical History       Procedure Laterality Date Comments Source    leg [Other] Left -- -- Provider                          Family as of 3/10/2024       Problem Relation Name Age of Onset Comments Source    Kidney disease Mother -- -- -- Provider    No Known Problems Father -- -- -- Provider    Asthma Daughter -- -- -- Provider    Asthma Son -- -- -- Provider    No Known Problems Maternal Grandmother -- -- -- Provider    No Known Problems Maternal Grandfather -- -- -- Provider    No Known Problems Paternal Grandmother -- -- -- Provider    No Known Problems Paternal Grandfather -- -- -- Provider                  Tobacco Use as of 3/10/2024       Smoking Status Smoking Start  Date Last Attempt to Quit Current Packs/Day Average Packs/Day    Every Day 2004 -- 0.5 0.5 packs/day for 20.2 years (10.1 ttl pk-yrs)   Pack Year History   Packs/Day From To Years    0.5 2004 -- 20.2      Smokeless Status Smokeless Type Smokeless Quit Date    Never -- --      Source    Provider                  Alcohol Use as of 3/10/2024       Alcohol Use Drinks/Week Alcohol/Week Comments Source    Not Currently   -- -- Provider                  Drug Use as of 3/10/2024       Drug Use Types Frequency Comments Source    Not Currently  Methamphetamines -- Meth in past Provider                  Sexual Activity as of 3/10/2024       Sexually Active Birth Control Partners Comments Source    Not Currently -- Male -- Provider                  Activities of Daily Living as of 3/10/2024    None               Social Documentation as of 3/10/2024    Patient currently lives in a sober living home in [facility] with tentative individuals.  She states she does have a warrant for her arrest and he was [place] for a unpaid traffic find.    Patient has previously been incarcerated.    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  She does not want to return to school she was 3 children all in the custody of her mother.  Her family those in [facility].  Source: Provider               Occupational as of 3/10/2024    None               Socioeconomic as of 3/10/2024       Marital Status Spouse Name Number of Children Years Education Education Level Preferred Language Ethnicity Race Source    Single -- 3 -- -- English Not  or /a White Provider                  Pertinent History       Question Response Comments    Lives with -- --    Place in Birth Order -- --    Lives in other --    Number of Siblings -- --    Raised by biological parents --    Legal Involvement criminal litigation --    Childhood Trauma uneventful --    Criminal History of arrest and incarceration --     Financial Status employed --    Highest Level of Education unfinished college --    Does patient have access to a firearm? No --     Service No --    Primary Leisure Activity -- --    Spirituality -- --          Past Medical History:   Diagnosis Date    Alcohol abuse     History of psychiatric hospitalization     Hx of psychiatric care      Past Surgical History:   Procedure Laterality Date    leg Left      Family History       Problem Relation (Age of Onset)    Asthma Daughter, Son    Kidney disease Mother    No Known Problems Father, Maternal Grandmother, Maternal Grandfather, Paternal Grandmother, Paternal Grandfather          Tobacco Use    Smoking status: Every Day     Current packs/day: 0.50     Average packs/day: 0.5 packs/day for 20.2 years (10.1 ttl pk-yrs)     Types: Cigarettes     Start date: 2004    Smokeless tobacco: Never   Substance and Sexual Activity    Alcohol use: Not Currently    Drug use: Not Currently     Types: Methamphetamines     Comment: Meth in past    Sexual activity: Not Currently     Partners: Male     Review of patient's allergies indicates:  No Known Allergies    Current Facility-Administered Medications on File Prior to Encounter   Medication    [COMPLETED] magnesium sulfate 2g in water 50mL IVPB (premix)    [COMPLETED] sodium chloride 0.9% bolus 1,000 mL 1,000 mL     Current Outpatient Medications on File Prior to Encounter   Medication Sig    atomoxetine (STRATTERA) 40 MG capsule Take 40 mg by mouth.    busPIRone (BUSPAR) 7.5 MG tablet Take 7.5 mg by mouth 2 (two) times daily.    citalopram (CELEXA) 20 MG tablet Take 20 mg by mouth.    fluticasone propionate (FLONASE) 50 mcg/actuation nasal spray 1 spray by Each Nostril route.    traZODone (DESYREL) 100 MG tablet Take 100 mg by mouth every evening.     Psychotherapeutics (From admission, onward)      Start     Stop Route Frequency Ordered    03/11/24 0900  buPROPion TB24 tablet 150 mg         -- Oral Daily 03/10/24 1318     03/10/24 1500  busPIRone tablet 5 mg         -- Oral 3 times daily 03/10/24 1318    03/10/24 0343  OLANZapine zydis disintegrating tablet 10 mg         -- Oral Every 8 hours PRN 03/10/24 0343          Review of Systems   Psychiatric/Behavioral:  Positive for decreased concentration, dysphoric mood, sleep disturbance and suicidal ideas. The patient is nervous/anxious.    Strengths and Liabilities: Strength: Patient is expressive/articulate., Strength: Patient is motivated for change., Liability: Patient has no suport network.    Objective:     Vital Signs (Most Recent):  Temp: 98.2 °F (36.8 °C) (03/10/24 0706)  Pulse: 95 (03/10/24 0706)  Resp: 18 (03/10/24 0706)  BP: 106/66 (03/10/24 0706)  SpO2: 97 % (03/10/24 0706) Vital Signs (24h Range):  Temp:  [98.2 °F (36.8 °C)-98.5 °F (36.9 °C)] 98.2 °F (36.8 °C)  Pulse:  [] 95  Resp:  [12-20] 18  SpO2:  [94 %-98 %] 97 %  BP: ()/(55-66) 106/66           There is no height or weight on file to calculate BMI.    No intake or output data in the 24 hours ending 03/10/24 1610       Physical Exam   Mental status examination:  38-year-old white female whose affect at this time was constricted.  Whose speech was with good articulation and execution.  His thought processes this time were linear and able to be tracked.  His thought content demonstrated no clear evidence of any auditory or visual hallucinations.  She made no statements to harm self.  She made no active statements to harm self but clearly had previous gestures and attempts to do so.  She did not show any evidence of auditory or visual hallucinations.  She was preoccupied with previous events in life events and past wrongs.  She had no current statements desires to harm others.  Her level intensity of paranoia was non-existent.  Insight and judgment deemed to be limited.  On cognitive evaluation she was alert and oriented to person, place, setting and time.  Immediate memory is 3/3 objects immediately.  2/3  objects 5 minutes.  Long-term memory appeared to be intact.  She can perform basic calculations serial threes.  Her fund of knowledge is deemed to be slightly above average.  She was adequately abstract.     Significant Labs: Last 72 Hours:   Recent Lab Results         03/09/24  1617   03/09/24  1509   03/09/24  1441        Phencyclidine     Negative       Albumin/Globulin Ratio   1.6         Acetaminophen Level   <17.4         Albumin   4.4         Alcohol, Serum   <10.0  Comment: This assay is performed for medical purposes only.         ALP   70         ALT   13         Amphetamines, Urine     Negative       Appearance, UA     Turbid       AST   18         Bacteria, UA     Trace       Barbituates, Urine     Negative       Baso #   0.13         Basophil %   1.3         Benzodiazepine, Urine     Negative       BILIRUBIN TOTAL   0.4         Bilirubin, UA     Negative       BUN   5.8         Calcium   9.0         Cannabinoids, Urine     Negative       Chloride   102         CO2   21         Cocaine, Urine     Negative       Color, UA     Light-Yellow       ID NOW COVID-19, (SENG) Negative           Creatinine   0.67         eGFR   >60         Eos #   0.10         Eos %   1.0         Fentanyl, Urine     Positive       Globulin, Total   2.7         Glucose   94         Glucose, UA     Normal       Hematocrit   41.1         Hemoglobin   14.0         Hyaline Casts, UA     6-10       Immature Grans (Abs)   0.05         Immature Granulocytes   0.5         Ketones, UA     Negative       Leukocyte Esterase, UA     Negative       Lymph #   2.14         LYMPH %   21.0         Magnesium    1.70         MCH   31.9         MCHC   34.1         MCV   93.6         MDMA, Urine     Negative       Mono #   0.80         Mono %   7.8         MPV   10.4         Mucous, UA     Trace       Neut #   6.99         Neut %   68.4         NITRITE UA     Negative       nRBC   0.0         Blood, UA     2+       Opiates, Urine     Negative       pH,  UA     5.5       pH, Urine     5.5       Platelet Count   284         Potassium   4.0         hCG Qualitative, Urine     Negative       PROTEIN TOTAL   7.1         Protein, UA     Trace       RBC   4.39         RBC, UA     0-5       RDW   12.5         Sodium   136         Specific Gravity,UA     1.014       Specific Gravity, Urine Auto     1.014       Squamous Epithelial Cells, UA     Occasional       TSH   2.391         Urobilinogen, UA     2.0       WBC, UA     0-5       WBC   10.21                 Significant Imaging: None  Assessment/Plan:     * Current severe episode of major depressive disorder without psychotic features without prior episode  Patient this time will be seen by this provider in terms of full evaluation for changes in current trials history of inattentiveness and low energy levels we will consider trials of Wellbutrin move away from trials of Celexa.  We will also go ahead and place patient on trials of Catapres for management overall sleep disturbance.  Patient this time clearly need of additional individuals to foster stabilization.  In conjunction with the use of the Catapres also will look to increase doses of BuSpar to 5 mg p.o. t.i.d. we will reassess.  Patient was clearly need ongoing services to foster recovery and stabilization..    Methamphetamine use disorder, severe  Patient will be monitored for any difficulty in terms of withdrawal from psychostimulants.  Patient does appear to have a mixed substance use history and most recent drug of choice has been methamphetamines.  We will reassess and re-evaluate as we move forward.       Estimated Discharge Date: 3/18/2024  Initial Discharge Plan: Home      Prognosis: Fair    Need for Continued Hospitalization:   Psychiatric illness continues to pose a potential threat to life or bodily function, of self or others, thereby requiring the need for continued inpatient psychiatric hospitalization., Protective inpatient psychiatric  hospitalization required while a safe disposition plan is enacted., and Requires ongoing hospitalization for stabilization of medications.    Total Time: 50 with greater than 50% of time spent in counseling and/or coordination of care.     Shiva Patterson MD   Psychiatry  [facility]

## 2024-03-10 NOTE — ASSESSMENT & PLAN NOTE
Patient will be monitored for any difficulty in terms of withdrawal from psychostimulants.  Patient does appear to have a mixed substance use history and most recent drug of choice has been methamphetamines.  We will reassess and re-evaluate as we move forward.

## 2024-03-10 NOTE — NURSING
Patient with noted agitation upon admit. Administered zydis 10 mg po. Nicotine patch 21 mg applied to right arm.

## 2024-03-10 NOTE — CONSULTS
[facility] Hospital Medicine  Consult Note    Patient Name: Mili Baker  MRN: [id number]  Admission Date: 3/10/2024  Hospital Length of Stay: 0 days  Attending Physician: Shiva Patterson MD   Primary Care Provider: Vangie King FNP           Patient information was obtained from patient and ER records.     Consults  Subjective:     Principal Problem: Suicidal ideation    Chief Complaint: suicidal.       HPI: 37 yo female admitted to U for suicidal ideation and attempt.  She denies any N/V/D/C.  No fever/chills.  No chest pain/SOB.  Labs were stable but she was positive for Fentanyl which may be a false positive.  She does smoke tobacco.  She denies any other medical illnesses.  Hospitalist have been consulted for medical evaluation.    No past medical history on file.    Past Surgical History:   Procedure Laterality Date    leg Left        Review of patient's allergies indicates:  No Known Allergies    Current Facility-Administered Medications on File Prior to Encounter   Medication    [COMPLETED] magnesium sulfate 2g in water 50mL IVPB (premix)    [COMPLETED] sodium chloride 0.9% bolus 1,000 mL 1,000 mL     Current Outpatient Medications on File Prior to Encounter   Medication Sig    atomoxetine (STRATTERA) 40 MG capsule Take 40 mg by mouth.    busPIRone (BUSPAR) 7.5 MG tablet Take 7.5 mg by mouth 2 (two) times daily.    citalopram (CELEXA) 20 MG tablet Take 20 mg by mouth.    fluticasone propionate (FLONASE) 50 mcg/actuation nasal spray 1 spray by Each Nostril route.    traZODone (DESYREL) 100 MG tablet Take 100 mg by mouth every evening.     Family History       Problem Relation (Age of Onset)    Asthma Daughter, Son    Kidney disease Mother    No Known Problems Father, Maternal Grandmother, Maternal Grandfather, Paternal Grandmother, Paternal Grandfather          Tobacco Use    Smoking status: Every Day     Current packs/day: 0.50     Average packs/day: 0.5 packs/day  for 20.2 years (10.1 ttl pk-yrs)     Types: Cigarettes     Start date: 2004    Smokeless tobacco: Never   Substance and Sexual Activity    Alcohol use: Not Currently    Drug use: Not Currently     Types: Methamphetamines     Comment: Meth in past    Sexual activity: Not Currently     Partners: Male     Review of Systems   Constitutional: Negative.    HENT: Negative.     Eyes: Negative.    Respiratory: Negative.     Cardiovascular: Negative.    Gastrointestinal: Negative.    Endocrine: Negative.    Genitourinary: Negative.    Musculoskeletal: Negative.    Skin: Negative.    Allergic/Immunologic: Negative.    Neurological: Negative.    Hematological: Negative.    Psychiatric/Behavioral:  Positive for suicidal ideas.      Objective:     Vital Signs (Most Recent):  Temp: 98.2 °F (36.8 °C) (03/10/24 0706)  Pulse: 95 (03/10/24 0706)  Resp: 18 (03/10/24 0706)  BP: 106/66 (03/10/24 0706)  SpO2: 97 % (03/10/24 0706) Vital Signs (24h Range):  Temp:  [98.2 °F (36.8 °C)-98.5 °F (36.9 °C)] 98.2 °F (36.8 °C)  Pulse:  [] 95  Resp:  [12-20] 18  SpO2:  [94 %-99 %] 97 %  BP: ()/(55-76) 106/66        There is no height or weight on file to calculate BMI.     Physical Exam  Constitutional:       Appearance: Normal appearance. She is normal weight.   HENT:      Head: Normocephalic and atraumatic.      Nose: Nose normal.      Mouth/Throat:      Mouth: Mucous membranes are moist.      Pharynx: Oropharynx is clear.   Eyes:      Extraocular Movements: Extraocular movements intact and EOM normal.      Conjunctiva/sclera: Conjunctivae normal.      Pupils: Pupils are equal, round, and reactive to light.   Cardiovascular:      Rate and Rhythm: Normal rate and regular rhythm.      Pulses: Normal pulses.      Heart sounds: Normal heart sounds.   Pulmonary:      Effort: Pulmonary effort is normal.      Breath sounds: Normal breath sounds.   Abdominal:      General: Bowel sounds are normal.      Palpations: Abdomen is soft.    Musculoskeletal:         General: Normal range of motion.      Cervical back: Normal range of motion and neck supple.   Skin:     General: Skin is warm and dry.      Capillary Refill: Capillary refill takes 2 to 3 seconds.   Neurological:      General: No focal deficit present.      Mental Status: She is alert and oriented to person, place, and time. Mental status is at baseline.   Psychiatric:         Attention and Perception: Attention normal.         Mood and Affect: Mood normal.         Speech: Speech normal.         Behavior: Behavior normal. Behavior is cooperative.         Thought Content: Thought content includes suicidal ideation.         Cognition and Memory: Cognition and memory normal.         CRANIAL NERVES     CN I  cranial nerve I not tested    CN II   Visual fields full to confrontation.     CN III, IV, VI   Pupils are equal, round, and reactive to light.  Extraocular motions are normal.   CN III: no CN III palsy  CN VI: no CN VI palsy    CN V   Facial sensation intact.     CN VII   Facial expression full, symmetric.     CN VIII   CN VIII normal.     CN IX, X   CN IX normal.   CN X normal.     CN XI   CN XI normal.     CN XII   CN XII normal.         Significant Labs: All pertinent labs within the past 24 hours have been reviewed.  BMP:   Recent Labs   Lab 03/09/24  1509      K 4.0   CO2 21*   BUN 5.8*   CREATININE 0.67   CALCIUM 9.0   MG 1.70     CBC:   Recent Labs   Lab 03/09/24  1509   WBC 10.21   HGB 14.0   HCT 41.1        CMP:   Recent Labs   Lab 03/09/24  1509      K 4.0   CO2 21*   BUN 5.8*   CREATININE 0.67   CALCIUM 9.0   ALBUMIN 4.4   BILITOT 0.4   ALKPHOS 70   AST 18   ALT 13     Magnesium:   Recent Labs   Lab 03/09/24  1509   MG 1.70       Significant Imaging: I have reviewed all pertinent imaging results/findings within the past 24 hours.  Assessment/Plan:     * Suicidal ideation  Admit to [facility]  OOB  Ambulate  Review meds      Cigarette nicotine dependence without  complication  Dangers of cigarette smoking were reviewed with patient in detail. Patient was Counseled for 3-10 minutes. Nicotine replacement options were discussed. Nicotine replacement was discussed- not prescribed per patient's request      VTE Risk Mitigation (From admission, onward)      None                Thank you for your consult. I will sign off. Please contact us if you have any additional questions.    Jesse Perdue MD  Department of Hospital Medicine   [facility]

## 2024-03-11 PROBLEM — F17.210 CIGARETTE NICOTINE DEPENDENCE WITHOUT COMPLICATION: Chronic | Status: RESOLVED | Noted: 2024-01-30 | Resolved: 2024-03-11

## 2024-03-11 PROBLEM — F17.200 TOBACCO USE DISORDER, MODERATE, DEPENDENCE: Status: ACTIVE | Noted: 2024-01-30

## 2024-03-11 LAB
CHOLEST SERPL-MCNC: 156 MG/DL
CHOLEST/HDLC SERPL: 4 {RATIO} (ref 0–5)
GLUCOSE P FAST SERPL-MCNC: 95 MG/DL (ref 70–100)
HDLC SERPL-MCNC: 43 MG/DL (ref 35–60)
LDLC SERPL CALC-MCNC: 99 MG/DL (ref 50–140)
OHS QRS DURATION: 78 MS
OHS QTC CALCULATION: 482 MS
T PALLIDUM AB SER QL: NONREACTIVE
TRIGL SERPL-MCNC: 72 MG/DL (ref 37–140)
VLDLC SERPL CALC-MCNC: 14 MG/DL

## 2024-03-11 PROCEDURE — 11400000 HC PSYCH PRIVATE ROOM

## 2024-03-11 PROCEDURE — 25000003 PHARM REV CODE 250: Performed by: PSYCHIATRY & NEUROLOGY

## 2024-03-11 PROCEDURE — 80061 LIPID PANEL: CPT | Performed by: PSYCHIATRY & NEUROLOGY

## 2024-03-11 PROCEDURE — 25000242 PHARM REV CODE 250 ALT 637 W/ HCPCS: Performed by: PSYCHIATRY & NEUROLOGY

## 2024-03-11 PROCEDURE — 86780 TREPONEMA PALLIDUM: CPT | Performed by: PSYCHIATRY & NEUROLOGY

## 2024-03-11 PROCEDURE — S4991 NICOTINE PATCH NONLEGEND: HCPCS | Performed by: PSYCHIATRY & NEUROLOGY

## 2024-03-11 PROCEDURE — 82947 ASSAY GLUCOSE BLOOD QUANT: CPT | Performed by: PSYCHIATRY & NEUROLOGY

## 2024-03-11 RX ADMIN — BUSPIRONE HYDROCHLORIDE 5 MG: 5 TABLET ORAL at 08:03

## 2024-03-11 RX ADMIN — BUPROPION HYDROCHLORIDE 150 MG: 150 TABLET, FILM COATED, EXTENDED RELEASE ORAL at 08:03

## 2024-03-11 RX ADMIN — IBUPROFEN 400 MG: 400 TABLET, FILM COATED ORAL at 08:03

## 2024-03-11 RX ADMIN — BUSPIRONE HYDROCHLORIDE 5 MG: 5 TABLET ORAL at 02:03

## 2024-03-11 RX ADMIN — ACETAMINOPHEN 650 MG: 325 TABLET, FILM COATED ORAL at 02:03

## 2024-03-11 RX ADMIN — CLONIDINE HYDROCHLORIDE 0.1 MG: 0.1 TABLET ORAL at 08:03

## 2024-03-11 RX ADMIN — NICOTINE 1 PATCH: 21 PATCH, EXTENDED RELEASE TRANSDERMAL at 08:03

## 2024-03-11 RX ADMIN — FLUTICASONE PROPIONATE 50 MCG: 50 SPRAY, METERED NASAL at 08:03

## 2024-03-11 NOTE — PROGRESS NOTES
Recreation Therapy Progress Note    Date: 3  11 2024     Time: 10:00 am group     Group Title: creative expression    Mood: anxious,and quiet . Pt also depressed     Behavior: pt participated in art and music class.    Affect: pt anxious and depressed     Speech: pt quiet but interacting verbally with prompts from staff.    Cognition: alert in group .    Participation Level: 100% in group the patient did well in art and music class. On emotions and self worth.     Intervention:cont rt services.         Recreation Therapist   Signature: Kirsty HEARTS

## 2024-03-11 NOTE — PLAN OF CARE
Problem: Suicide Risk  Goal: Absence of Self-Harm  Outcome: Ongoing, Progressing  Intervention: Assess Risk to Self and Maintain Safety  Flowsheets (Taken 3/11/2024 1626)  Behavior Management: impulse control promoted  Intervention: Promote Psychosocial Wellbeing  Flowsheets (Taken 3/11/2024 1626)  Sleep/Rest Enhancement: regular sleep/rest pattern promoted  Supportive Measures:   self-care encouraged   active listening utilized   decision-making supported   problem-solving facilitated  Family/Support System Care: self-care encouraged     Problem: Adult Behavioral Health Plan of Care  Goal: Plan of Care Review  Outcome: Ongoing, Progressing  Goal: Patient-Specific Goal (Individualization)  Outcome: Ongoing, Progressing  Goal: Adheres to Safety Considerations for Self and Others  Outcome: Ongoing, Progressing  Goal: Absence of New-Onset Illness or Injury  Outcome: Ongoing, Progressing  Intervention: Prevent Infection  Flowsheets (Taken 3/11/2024 1626)  Infection Prevention: hand hygiene promoted  Goal: Optimized Coping Skills in Response to Life Stressors  Outcome: Ongoing, Progressing  Intervention: Promote Effective Coping Strategies  Flowsheets (Taken 3/11/2024 1626)  Supportive Measures:   self-care encouraged   active listening utilized   decision-making supported   problem-solving facilitated  Goal: Develops/Participates in Therapeutic Lodge to Support Successful Transition  Outcome: Ongoing, Progressing  Intervention: Foster Therapeutic Lodge  Flowsheets (Taken 3/11/2024 1626)  Trust Relationship/Rapport:   emotional support provided   empathic listening provided   questions answered   questions encouraged   reassurance provided   thoughts/feelings acknowledged   care explained  Goal: Rounds/Family Conference  Outcome: Ongoing, Progressing     Problem: Depression  Goal: Improved Mood  Outcome: Ongoing, Progressing  Intervention: Monitor and Manage Depressive Symptoms  Flowsheets (Taken 3/11/2024  1626)  Supportive Measures:   self-care encouraged   active listening utilized   decision-making supported   problem-solving facilitated  Family/Support System Care: self-care encouraged

## 2024-03-11 NOTE — PLAN OF CARE
Problem: Adult Inpatient Plan of Care  Goal: Plan of Care Review  Outcome: Ongoing, Progressing  Goal: Patient-Specific Goal (Individualized)  Outcome: Ongoing, Progressing  Goal: Absence of Hospital-Acquired Illness or Injury  Outcome: Ongoing, Progressing  Intervention: Identify and Manage Fall Risk  Flowsheets (Taken 3/11/2024 0009)  Safety Promotion/Fall Prevention: nonskid shoes/socks when out of bed  Intervention: Prevent Infection  Flowsheets (Taken 3/11/2024 0009)  Infection Prevention: hand hygiene promoted  Goal: Optimal Comfort and Wellbeing  Outcome: Ongoing, Progressing  Intervention: Provide Person-Centered Care  Flowsheets (Taken 3/11/2024 0009)  Trust Relationship/Rapport:   care explained   choices provided   questions answered   questions encouraged  Goal: Readiness for Transition of Care  Outcome: Ongoing, Progressing     Problem: Suicide Risk  Goal: Absence of Self-Harm  Outcome: Ongoing, Progressing  Intervention: Assess Risk to Self and Maintain Safety  Flowsheets (Taken 3/11/2024 0009)  Behavior Management:   behavioral plan reviewed   boundaries reinforced

## 2024-03-11 NOTE — PROGRESS NOTES
Recreation Therapy Assessment    1. MOOD:  Patient states she has anxiety and depression.  Patient has suicidal attempt with overdose.    2. BEHAVIOR:  Patient is cooperative throughout interview    3. AFFECT:  Patient states she has anxiety and depression    4. COGNITION:  Patient was alert and attentive throughout interview    5. MOTOR BEHAVIOR/SKILLS:  Patient is ambulatory    6. SPEECH:  Normal and talking well throughout interview    7. LEISURE FUNCTIONING:  Patient has declined in daily living skills due to anxiety and depression    8. LEISURE NEEDS:  To regain positive lifestyle leisure skills interested in participation self-worth and quality of life    9. PT EDUCATION LEVEL:  Patient has 12th grade education and 1-1/2 years  of college community college    10. WHAT IS THE BEST THING THAT EVER HAPPENED TO YOU?  My children patient states    11. THINGS THAT FRUSTRATE AND ANGER ME ARE:  Patient is a lot of anxiety and depression lately she states    12. WHEN I GET ANGRY, I USUALLY:  Keep a lot of feelings in patient stated.  Are sometimes fuss patient states    13. MY RELATIONSHIP WITH MOST PEOPLE ARE:  Not so good lately patient states    14. LEISURE INTERESTS/SKILLS:  Patient enjoys fishing sports outdoor activities music in [place] she states    15. LEISURE BARRIERS:     Patient has had suicidal thoughts in his declined in daily living skills  16. ASSESSMENT SUMMARY:  Patient is a 38-year-old white female.  Patient was suicidal by attempted overdose.  Patient has 3 children she state. patient lives in [facility].  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  Patient states that her mother has a children right now.      17. TX PLAN FOR RECREATION THERAPY:  Patient will receive recreational therapy services 2 times a day and 5 times a week with Mrelene mccullough .  Patient will be assisted to complete 3-4 assignments on problem-solving skills and emotional outlets to enhance coping skills in daily life.   Patient will be assisted to complete 3-4 assignments on positive emotional outlets leisure skills interests self-worth and participation to enhance positive ways to express emotions ,self-esteem , problem-solving skills, leisure skills interests and participation.  Patient will utilize art music cognitive games and exercise to achieve these goals.  Assist patient one-to-one as needed to achieve her goals and be successful as a result enhance her quality of life once  home and discharge.  Patient will utilize art music cognitive games and exercise to achieve these goals.  Assist patient one-to-one as needed to achieve her goals.      18. SIGNATURE/CREDENTIALS:    Merlene Dumont ma ctrs                                                                  DATE:  03/11/2024                            TIME:  8:11 a.m.        RECREATION THERAPIST  CHIVO

## 2024-03-11 NOTE — GROUP NOTE
Education Group      Group Focus: Offered group on discharge planning.       Number of patients in attendance: 5    Group Start Time: 1130  Group End Time:  1215  Groups Date: 3/11/2024  Group Topic:  Behavioral Health  Group Department: [facility]  Group Facilitators:  Korina Boucher LPN  _____________________________________________________________________    Patient Name: Mili Baker  MRN: [id number]  Patient Class: IP- Psych   Admission Date\Time: 3/10/2024  3:15 AM  Hospital Length of Stay: 1  Primary Care Provider: Vangie King FNP     Referred by: Behavioral Medicine Unit Treatment Team     Target symptoms: Depression     Patient's response to treatment: Active Listening, Self-disclosure, and Frequent Questions     Progress toward goals: Progressing adequately     Interval History: Verbalized understanding. Good participation.     Diagnosis: MDD     Plan: Continue treatment on BMU

## 2024-03-11 NOTE — NURSING
Daily Nursing Note:      Behavior:    Patient (Mili Baker is a 38 y.o. female, : 1985, MRN: [id number]) demonstrating an affect that was sad, flat, and anxious. Mili demonstrating mood that is depressed and anxious. Mili had an appearance that was disheveled. Mili denies suicidal ideation. Mili denies suicide plan. Mili denies homicidal ideation. Mili denies hallucinations. She ate 0/0/100% of her meals today.    Mili's  temperature is 97.9 °F (36.6 °C). Her blood pressure is 102/67 and her pulse is 84. Her respiration is 18 and oxygen saturation is 96%.     Intervention:    Encourage Mili to perform self-hygiene, grooming, and changing of clothing. Monitor Miil's behavior and program compliance. Monitor Mili for suicidal ideation, homicidal ideation, sleep disturbance, and hallucinations. Encourage Mili to eat all portions of meals and assess for meal preferences. Monitor Mili for intake and output to ensure hydration. Notify the Physician (MD) for any medication refusal and any change in patient condition.      Response:    Mili verbalizes understand of unit process and procedures. Mili is compliant with her medications.      Plan:     Continue to monitor per MD orders; maintain patient safety.    Q15min safety checks in progress. Assault and suicide precautions maintained.

## 2024-03-11 NOTE — PROGRESS NOTES
Recreation Therapy Progress Note    Date:  03/11/2024    Time:  1400    Group Title:  Leisure skills    Mood:  Patient patient is less anxious    Behavior:  Patient is cooperative and participate in recreational therapy group this afternoon    Affect:  Patient is less anxious and participate more in group.  Art and music class helps her to relax    Speech:  Patient is talking more in group with prompts from staff at less intervals this afternoon.    Cognition:  Patient is alert and focused and cognitive game in art class.  Patient also participated not do activity.    Participation Level:  Patient participate 100% patient is less anxious in recreational therapy group.  The art and music class helps her to relax    Intervention:  Continue recreational therapy services.  Assist patient one-to-one as needed.          Recreation Therapist   Signature:  Merlene ashleys

## 2024-03-11 NOTE — PROGRESS NOTES
Hospital day 1.     38-year-old white female who at this time is verbal and does appear to be making fairly good introduction to the unit.  She was had no difficulty recommendations with current medication trials including Wellbutrin, BuSpar and clonidine.  She states she was able to sleep much better last evening.  She states her quality asleep was improved.    Patient this time verbalizes a tremendous amount ambivalent feelings particularly regards to her so current sober living environment.  Discussed with her overall need to maintain appropriate boundaries and limits with those sober living home.  She was able to hear these interpretations.    Patient this time verbalizes concern about not participate enough in AA.  Discussed with her strategies in which she may improve her overall socialization and participation.    On mental status examination:  38-year-old white female whose affect at this time is with a little bit more range.  Whose speech was with fairly good articulation today.  His thought content demonstrated no clear evidence of any auditory or visual hallucinations.  She was making no active statements to harm self.  She made no active statements to harm others.  She did not show delusions or distortions were overall perceptions.  Her insight and judgment this time were deemed to be limited orientation was intact.    Impression:  Suicide ideations/resolving  Major depressive disorder recurrent moderate to severe without psychotic features/patient this time need ongoing services has not achieved adequate stabilization with current medication trials   Alcohol use disorder  Methamphetamine use disorder  Tobacco use disorder     Estimated continued stay 96 hours or more    Indications:  Patient this time continues show evidence of marked instability overall mood has poor adaptive strategies clearly has not achieved full stabilization with current medication trials    Recommendations:  1. Continue trials of  clonidine 0.1 mg p.o. q.h.s. at hours sleep for now.  We will need to be re-evaluated as patient has complained of some sedation   2. Continue trials of BuSpar 5 mg p.o. t.i.d. for indications anxiety and impulse   3.  Continue trials of Wellbutrin  mg p.o. q.a.m. for targeting symptoms of mood and mood instability  4.  We will reassess re-evaluate patient this time has not reach full maximum benefit made at ongoing risk to self-harm, decompensate if lower level care.

## 2024-03-12 PROCEDURE — 25000003 PHARM REV CODE 250: Performed by: PSYCHIATRY & NEUROLOGY

## 2024-03-12 PROCEDURE — 11400000 HC PSYCH PRIVATE ROOM

## 2024-03-12 PROCEDURE — S4991 NICOTINE PATCH NONLEGEND: HCPCS | Performed by: PSYCHIATRY & NEUROLOGY

## 2024-03-12 RX ORDER — BUSPIRONE HYDROCHLORIDE 10 MG/1
10 TABLET ORAL 3 TIMES DAILY
Status: DISCONTINUED | OUTPATIENT
Start: 2024-03-12 | End: 2024-03-14 | Stop reason: HOSPADM

## 2024-03-12 RX ADMIN — BUSPIRONE HYDROCHLORIDE 5 MG: 5 TABLET ORAL at 08:03

## 2024-03-12 RX ADMIN — BUSPIRONE HYDROCHLORIDE 10 MG: 10 TABLET ORAL at 08:03

## 2024-03-12 RX ADMIN — NICOTINE 1 PATCH: 21 PATCH, EXTENDED RELEASE TRANSDERMAL at 08:03

## 2024-03-12 RX ADMIN — BUSPIRONE HYDROCHLORIDE 5 MG: 5 TABLET ORAL at 02:03

## 2024-03-12 RX ADMIN — FLUTICASONE PROPIONATE 50 MCG: 50 SPRAY, METERED NASAL at 08:03

## 2024-03-12 RX ADMIN — CLONIDINE HYDROCHLORIDE 0.1 MG: 0.1 TABLET ORAL at 08:03

## 2024-03-12 RX ADMIN — ACETAMINOPHEN 650 MG: 325 TABLET, FILM COATED ORAL at 08:03

## 2024-03-12 RX ADMIN — BUPROPION HYDROCHLORIDE 150 MG: 150 TABLET, FILM COATED, EXTENDED RELEASE ORAL at 08:03

## 2024-03-12 NOTE — GROUP NOTE
"Education Group      Group Focus: Offered group to increase coping skills.       Number of patients in attendance: 4    Group Start Time: 0900  Group End Time:  0945  Groups Date: 3/12/2024  Group Topic:  Behavioral Health  Group Department: [facility]  Group Facilitators:  Korina Boucher LPN  _____________________________________________________________________    Patient Name: Mili Baker  MRN: [id number]  Patient Class: IP- Psych   Admission Date\Time: 3/10/2024  3:15 AM  Hospital Length of Stay: 2  Primary Care Provider: Vangie King FNP     Referred by: Behavioral Medicine Unit Treatment Team     Target symptoms: Depression and Anxiety     Patient's response to treatment: Active Listening, Self-disclosure, and Feedback given to another patient     Progress toward goals: Progressing well     Interval History: Verbalized understanding. Good participation. " I need to let my feelings be real"      Diagnosis: MD     Plan: Continue treatment on BMU    "

## 2024-03-12 NOTE — PLAN OF CARE
Problem: Suicide Risk  Goal: Absence of Self-Harm  Outcome: Ongoing, Progressing  Intervention: Assess Risk to Self and Maintain Safety  Flowsheets (Taken 3/12/2024 1732)  Behavior Management: impulse control promoted  Intervention: Promote Psychosocial Wellbeing  Flowsheets (Taken 3/12/2024 1732)  Sleep/Rest Enhancement:   awakenings minimized   regular sleep/rest pattern promoted   relaxation techniques promoted  Supportive Measures:   active listening utilized   counseling provided   decision-making supported   goal-setting facilitated   positive reinforcement provided   problem-solving facilitated   relaxation techniques promoted   self-care encouraged   self-reflection promoted   self-responsibility promoted   verbalization of feelings encouraged  Family/Support System Care: self-care encouraged  Intervention: Establish Safety Plan and Continuity of Care  Flowsheets (Taken 3/12/2024 1732)  Safe Transition Promotion: support system integrity appraised     Problem: Adult Behavioral Health Plan of Care  Goal: Plan of Care Review  Outcome: Ongoing, Progressing  Goal: Patient-Specific Goal (Individualization)  Outcome: Ongoing, Progressing  Goal: Adheres to Safety Considerations for Self and Others  Outcome: Ongoing, Progressing  Intervention: Develop and Maintain Individualized Safety Plan  Flowsheets (Taken 3/12/2024 1732)  Safety Measures:   environmental rounds completed   safety rounds completed   suicide assessment completed  Goal: Absence of New-Onset Illness or Injury  Outcome: Ongoing, Progressing  Intervention: Identify and Manage Fall Risk  Flowsheets (Taken 3/12/2024 1732)  Safety Measures:   environmental rounds completed   safety rounds completed   suicide assessment completed  Intervention: Prevent Infection  Flowsheets (Taken 3/12/2024 1732)  Infection Prevention:   hand hygiene promoted   rest/sleep promoted  Goal: Optimized Coping Skills in Response to Life Stressors  Outcome: Ongoing,  Progressing  Intervention: Promote Effective Coping Strategies  Flowsheets (Taken 3/12/2024 1732)  Supportive Measures:   active listening utilized   counseling provided   decision-making supported   goal-setting facilitated   positive reinforcement provided   problem-solving facilitated   relaxation techniques promoted   self-care encouraged   self-reflection promoted   self-responsibility promoted   verbalization of feelings encouraged  Goal: Develops/Participates in Therapeutic [place] to Support Successful Transition  Outcome: Ongoing, Progressing  Intervention: Foster Therapeutic [place]  Flowsheets (Taken 3/12/2024 1732)  Trust Relationship/Rapport:   care explained   choices provided   emotional support provided   empathic listening provided   questions answered   questions encouraged   reassurance provided   thoughts/feelings acknowledged  Intervention: Mutually Develop Transition Plan  Flowsheets (Taken 3/12/2024 1735)  Current Discharge Risk:   substance use/abuse   psychiatric illness  Outpatient/Agency/Support Group Needs:   group home   outpatient counseling   outpatient medication management   outpatient psychiatric care (specify)   outpatient substance abuse treatment (specify)  Transition Support: (possible group home placement) follow-up care discussed  Anticipated Discharge Disposition: intermediate  Patient/Family Anticipated Services at Transition:   mental health services   outpatient care  Concerns to be Addressed:   basic needs   discharge planning   mental health   substance/tobacco abuse/use  Goal: Rounds/Family Conference  Outcome: Ongoing, Progressing  Flowsheets (Taken 3/12/2024 1739)  Participants: art therapy     Problem: Depression  Goal: Improved Mood  Outcome: Ongoing, Progressing  Intervention: Monitor and Manage Depressive Symptoms  Flowsheets (Taken 3/12/2024 1739)  Complementary Therapy:   art therapy provided   music therapy provided  Family/Support System Care: self-care  encouraged

## 2024-03-12 NOTE — PROGRESS NOTES
Recreation Therapy Progress Note    Date:  03/12/2024    Time:  1400    Group Title:  Leisure skills    Mood:  Patient is less depressed she states and has less anxiety today.    Behavior:  Patient participated in group    Affect:  Patient states she is less depressed today in her anxiety is low    Speech:  Patient talking a little bit more in group with less prompts from staff    Cognition:  Patient alert and able to focused on cognitive art activity of her interested in request.   Art helped her anxiety level to decrease    Participation Level:  100%    Intervention:  Continue RT services        Recreation Therapist   Signature:  Merlene Dumont MA CTRS

## 2024-03-12 NOTE — NURSING
"Daily Nursing Note:      Behavior:    Patient (Mili Baker is a 38 y.o. female, : 1985, MRN: [id number]) demonstrating an affect that was flat. Mili demonstrating mood that is depressed. Mili had an appearance that was disheveled. Mili denies suicidal ideation. Mili denies suicide plan. Mili denies homicidal ideation. Mili denies hallucinations.    Merricks  height is 4' 11" (1.499 m) and weight is 64.3 kg (141 lb 12.1 oz). Her temperature is 98.5 °F (36.9 °C). Her blood pressure is 107/69 and her pulse is 65. Her respiration is 16 and oxygen saturation is 98%.     Merricks last BM was noted on: _3/11/24______      Intervention:    Encourage Mili to perform self-hygiene, grooming, and changing of clothing. Monitor Merricks behavior and program compliance. Monitor Mili for suicidal ideation, homicidal ideation, sleep disturbance, and hallucinations. Encourage Mili to eat all portions of meals and assess for meal preferences. Monitor Mili for intake and output to ensure hydration. Notify the Physician/Physician Assistant/Advance Practice Registered Nurse (MD/PA/APRN) for any medication refusal and any change in patient condition.      Response:    Mili verbalizes understand of unit process and procedures. Mili is compliant with meds.      Plan:     Continue to monitor per MD/PA/APRN orders; maintain patient safety.  "

## 2024-03-12 NOTE — NURSING
"Daily Nursing Note:      Behavior:    Patient (Mili Baker is a 38 y.o. female, : 1985, MRN: [id number]) demonstrating an affect that was anxious. Miil demonstrating mood that is anxious. Mili had an appearance that was good hygiene. Mili denies suicidal ideation. Mili denies suicide plan. Mili denies homicidal ideation. Mili denies hallucinations.    Merricks  height is 4' 11" (1.499 m) and weight is 64.3 kg (141 lb 12.1 oz). Her temperature is 98.8 °F (37.1 °C). Her blood pressure is 111/52 (abnormal) and her pulse is 68. Her respiration is 16 and oxygen saturation is 98%.     Merricks last BM was noted on: 3/11/2024  Intervention:    Encouraged Mili to perform self-hygiene, grooming, and changing of clothing. Monitored Merricks behavior and program compliance. Monitored Mili for suicidal ideation, homicidal ideation, sleep disturbance, and hallucinations. Encouraged Mili to eat all portions of meals and assess for meal preferences. Monitored Mili for intake and output to ensure hydration. Notify the Physician for any medication refusal and any change in patient condition.      Response:    Mili verbalizes understand of unit process and procedures. Mili reported no issues with her medications.  States, "as a matter of fact, this is one of the few times I can remember not wanting to jump out of my skin.        Plan:     Continue to monitor per MD orders; maintain patient safety with safety checks Q15min  and prn.     "

## 2024-03-12 NOTE — PROGRESS NOTES
ANNETTE # 2    38-year-old white female who at this time shows evidence of a slight improvement in terms of functioning.  She reports still is having some headache and questioned whether this may be arising from her medications.  Today she reports not quite as intense.  Last night her quality asleep although with better was not as good as the night before.      She does not complain of any lightheaded or dizziness upon arousal today.    She reports that this time still having some anxieties and worries about return to outpatient level of care.      Staff and will be held tomorrow but we will request that  reach out to her sober living home to document whether she can return to that environment.  Would like to make sure that patient was has made adequate transitioned to Wellbutrin.  He was educated that patient was hair adequate sleep stabilization with for discharge.      On mental status examination:  38-year-old white female whose affect at this time was with improved range.  Her speech was with fairly good articulation and execution.  Her thought processes this time were linear and could be tracked.  Her thought content demonstrated no clear evidence of any auditory or visual hallucinations.  She made no active statements to harm self.  She made no active statements to harm others.  Although making no active statements she clearly has no adaptive skills for coping with feeling suicidal or at risk to relapse.  Her insight and judgment deemed to be limited orientation was intact.      Impression:  Suicide ideation  Major depressive disorder recurrent moderate to severe without psychotic features   Substance use disorder     Estimated continued hospitalization 48-72 hours     Indications:  Patient this time has been shifted to trials of medications recent difficulty in terms of possible adverse effects with medications need of continued monitoring to assure safety and compliance     Recommendations:  1.   Continue trials of Wellbutrin  mg p.o. q.day.    2. We will increase doses of BuSpar to 10 mg p.o. t.i.d..  3.  We will continue trials of Catapres 0.1 mg p.o. q.h.s. we will monitor for any orthostatic changes or further complaints of headache.    4. We will staff tomorrow and look towards attempts to reintegrate patient was into sober living home with the next 48 hours.

## 2024-03-12 NOTE — PSYCH EVALUATION
Behavioral Health Unit  Psychosocial History and Assessment  Progress Note      Patient Name: Mili Baker YOB: 1985 SW: Richard Williamsoniddielio, [facility] Date: 3/12/2024    Chief Complaint: addictive disorder    Consent:     Did the patient consent for an interview with the ? Yes    Did the patient consent for the  to contact family/friend/caregiver?   No    Did the patient give consent for the  to inform family/friend/caregiver of his/her whereabouts or to discuss discharge planning? No    Source of Information: Face to face with patient and Chart review    Is information obtained from interviews considered reliable?   yes    Reason for Admission:     Active Hospital Problems    Diagnosis  POA    *Current severe episode of major depressive disorder without psychotic features without prior episode [F32.2]  Yes    Suicidal ideation [R45.851]  Not Applicable    Methamphetamine use disorder, severe [F15.20]  No    Tobacco use disorder, moderate, dependence [F17.200]  Yes      Resolved Hospital Problems   No resolved problems to display.       History of Present Illness - (Patient Perception):   Jacquelyn states she was not trying to OD but just wanted to sleep.  Pt currently lives in sober living and states that it has become overwhelming in the house.  Pt states that she was in [facility] for treatment and has been in sober living for 5 months.  Pt reports she has a long history of polysubstance use since she was a teenager.  Pt currently is attempting to get back in to sober living house and she states she has talked to them and she can go back although that is not confirmed at this time.    History of Present Illness - (Perception of Others): years according to patient    Present biopsychosocial functioning: low    Past biopsychosocial functioning: pt has a history of higher function.    Family and Marital/Relationship History:     Significant Other/Partner  Relationships:  Single:  Pt has 3 children 17, 14 and 11 in the custody of her mother.  She does not see them.  Family is somewhat estranged due to her behaviors.    Family Relationships: Estranged      Childhood History:     Where was patient raised? [place]    Who raised the patient? parents      How does patient describe their childhood? Ok      Who is patient's primary support person? caregivers      Culture and Cheondoism:     Cheondoism: Unknown    How strong of a role does Hoahaoism and spirituality play in patient's life? none    Buddhism or spiritual concerns regarding treatment: not applicable     History of Abuse:   History of Abuse: Denies      Outcome:     Psychiatric and Medical History:     History of psychiatric illness or treatment: prior inpatient treatment, has participated in counseling/psychotherapy on an outpatient basis in the past, and currently under psychiatric care    Medical history:   Past Medical History:   Diagnosis Date    Alcohol abuse     History of psychiatric hospitalization     Hx of psychiatric care        Substance Abuse History:     Alcohol - (Patient Perspective):   Social History     Substance and Sexual Activity   Alcohol Use Not Currently       Alcohol - (Collateral Perspective): history according to pateint    Drugs - (Patient Perspective):   Social History     Substance and Sexual Activity   Drug Use Not Currently    Types: Methamphetamines    Comment: Meth in past       Drugs - (Collateral Perspective): history of polysubstance abuse according to pateint    Additional Comments: Recent rehab, pt has bee in sober living 5 months    Education:     Currently Enrolled? No  High School (9-12) or GED    Special Education? No    Interested in Completing Education/GED: Yes    Employment and Financial:     Currently employed? Employed: Current Occupation:     Source of Income: salary    Able to afford basic needs (food, shelter, utilities)? Yes    Who manages  finances/personal affairs? patient      Service:     ? no    Combat Service? No     Community Resources:     Describe present use of community resources: [facility] with FNP     Identify previously used community resources   (Include previous mental health treatment - outpatient and inpatient): multiple inpatient    Environmental:     Current living situation:group home    Social Evaluation:     Patient Assets: General fund of knowledge, Motivation for treatment/growth, Capable of independent living, and Work skills    Patient Limitations: risk of relapse, poor coping skills    High risk psychosocial issues that may impact discharge planning:   Risk of relapse    Recommendations:     Anticipated discharge plan:   group home    High risk issues requiring early treatment planning and immediate intervention: risk of relapse    Community resources needed for discharge planning:  living arrangements and aftercare treatment sources    Anticipated social work role(s) in treatment and discharge planning:  will offer advice and  as well as group therapy 4x per week and individual as needed.   will assist with discharge planning and referrals to aftercare resources.

## 2024-03-12 NOTE — PROGRESS NOTES
Recreation Therapy Progress Note    Date: 3  12  2024    Time: 10:00 am group    Group Title: creative expression    Mood: depressed a little less today. But hopeful    Behavior: participated in group    Affect: depressed     Speech: pt talking more     Cognition: alert and focused on assignment in art and music    Participation Level: pt art work showed less depression and less SI thoughts.pt talked about her feelings with the group about depression and the thoughts she had prior to admission.pt states she does not want to hurt herself anymore.pt thanked RT staff for having art class on emotions. Pt dad showed up a lot in her art worked the patient talked about her relationship with her dad.     Intervention:con rt services.           Recreation Therapist   Signature: Kirsty larkin MA CTRS.

## 2024-03-13 PROCEDURE — 11400000 HC PSYCH PRIVATE ROOM

## 2024-03-13 PROCEDURE — S4991 NICOTINE PATCH NONLEGEND: HCPCS | Performed by: PSYCHIATRY & NEUROLOGY

## 2024-03-13 PROCEDURE — 25000003 PHARM REV CODE 250: Performed by: PSYCHIATRY & NEUROLOGY

## 2024-03-13 RX ORDER — HYDROXYZINE PAMOATE 25 MG/1
50 CAPSULE ORAL EVERY 8 HOURS PRN
Status: DISCONTINUED | OUTPATIENT
Start: 2024-03-13 | End: 2024-03-14 | Stop reason: HOSPADM

## 2024-03-13 RX ORDER — BUPROPION HYDROCHLORIDE 150 MG/1
150 TABLET ORAL DAILY
Qty: 30 TABLET | Refills: 1 | Status: SHIPPED | OUTPATIENT
Start: 2024-03-14 | End: 2024-05-13

## 2024-03-13 RX ORDER — CLONIDINE HYDROCHLORIDE 0.1 MG/1
0.1 TABLET ORAL NIGHTLY
Qty: 30 TABLET | Refills: 1 | Status: SHIPPED | OUTPATIENT
Start: 2024-03-13 | End: 2024-05-12

## 2024-03-13 RX ADMIN — BUSPIRONE HYDROCHLORIDE 10 MG: 10 TABLET ORAL at 03:03

## 2024-03-13 RX ADMIN — IBUPROFEN 400 MG: 400 TABLET, FILM COATED ORAL at 06:03

## 2024-03-13 RX ADMIN — NICOTINE 1 PATCH: 21 PATCH, EXTENDED RELEASE TRANSDERMAL at 08:03

## 2024-03-13 RX ADMIN — ACETAMINOPHEN 650 MG: 325 TABLET, FILM COATED ORAL at 01:03

## 2024-03-13 RX ADMIN — IBUPROFEN 400 MG: 400 TABLET, FILM COATED ORAL at 03:03

## 2024-03-13 RX ADMIN — HYDROXYZINE PAMOATE 50 MG: 25 CAPSULE ORAL at 06:03

## 2024-03-13 RX ADMIN — ACETAMINOPHEN 650 MG: 325 TABLET, FILM COATED ORAL at 08:03

## 2024-03-13 RX ADMIN — CLONIDINE HYDROCHLORIDE 0.1 MG: 0.1 TABLET ORAL at 08:03

## 2024-03-13 RX ADMIN — HYDROXYZINE PAMOATE 50 MG: 25 CAPSULE ORAL at 01:03

## 2024-03-13 RX ADMIN — FLUTICASONE PROPIONATE 50 MCG: 50 SPRAY, METERED NASAL at 08:03

## 2024-03-13 RX ADMIN — BUPROPION HYDROCHLORIDE 150 MG: 150 TABLET, FILM COATED, EXTENDED RELEASE ORAL at 08:03

## 2024-03-13 RX ADMIN — BUSPIRONE HYDROCHLORIDE 10 MG: 10 TABLET ORAL at 08:03

## 2024-03-13 NOTE — PLAN OF CARE
Problem: Suicide Risk  Goal: Absence of Self-Harm  Outcome: Met     Problem: Adult Behavioral Health Plan of Care  Goal: Adheres to Safety Considerations for Self and Others  Intervention: Develop and Maintain Individualized Safety Plan  Flowsheets (Taken 3/13/2024 1732)  Safety Measures:   environmental rounds completed   suicide assessment completed  Goal: Optimized Coping Skills in Response to Life Stressors  Outcome: Ongoing, Progressing  Intervention: Promote Effective Coping Strategies  Flowsheets (Taken 3/13/2024 1732)  Supportive Measures:   active listening utilized   goal-setting facilitated   self-care encouraged   verbalization of feelings encouraged   self-responsibility promoted     Problem: Depression  Goal: Improved Mood  Outcome: Ongoing, Progressing  Intervention: Monitor and Manage Depressive Symptoms  Flowsheets (Taken 3/13/2024 1732)  Supportive Measures:   active listening utilized   goal-setting facilitated   self-care encouraged   verbalization of feelings encouraged   self-responsibility promoted     Problem: Depression  Goal: Improved Mood  Outcome: Ongoing, Progressing  Intervention: Monitor and Manage Depressive Symptoms  Flowsheets (Taken 3/13/2024 1732)  Supportive Measures:   active listening utilized   goal-setting facilitated   self-care encouraged   verbalization of feelings encouraged   self-responsibility promoted

## 2024-03-13 NOTE — NURSING
"     Daily Nursing Note:        Behavior:     Patient (Mili Baker is a 38 y.o. female, : 1985, MRN: [id number]) demonstrating an affect that was anxious. Mili demonstrating mood that is anxious. Mili had an appearance that was in good hygiene. Mili denies suicidal ideation. Mili denies suicide plan. Mili denies homicidal ideation. Mili denies hallucinations.     Merricks  height is 4' 11" (1.499 m) and weight is 64.3 kg (141 lb 12.1 oz).   Intervention:     Encourage Mili to perform self-hygiene, grooming, and changing of clothing. Monitor Merricks behavior and program compliance. Monitor Mili for suicidal ideation, homicidal ideation, sleep disturbance, and hallucinations. Encourage Mili to eat all portions of meals and assess for meal preferences. Monitor Mili for intake and output to ensure hydration. Notify the Physician/Physician Assistant/Advance Practice Registered Nurse (MD/PA/APRN) for any medication refusal and any change in patient condition.        Response:     Mili verbalizes understand of unit process and procedures. Mili is compliant with meds.        Plan:      Continue to monitor per MD/PA/APRN orders; maintain patient safety.           "

## 2024-03-13 NOTE — NURSING
PRN Tylenol and Vistaril F/U: Pt lying in bed quietly. PRNs effective. Both Lt knee pain and Anxiety=0.

## 2024-03-13 NOTE — NURSING
Pt displayed c/o Lt knee pain=5 and Anxiety=3. Tylenol 650mg given PO for pain. Pt refused any coverage for Anxiety.

## 2024-03-13 NOTE — PROGRESS NOTES
ANNETTE # 3    38-year-old white female who at this time reports she slept fairly well last night.  Patient this time reports that she has some anxiety.  She did require Vistaril last night in order to have stable sleep throughout the entire night.  She continues to report that she has been dream material.  Am hesitant to increase doses of clonidine at this time.      Patient this time shows evidence of improvement in terms of mood.  She was brighter in her affect.  She was certainly was not responding to intentions to self injure at this time.  With her she does express some anxiety about returning to sober living.    She was motivated to return to AA.  She was also motivated to participate outpatient treatment.    Counseled her at length particularly regarding her need to be more active in AA routine and continuous basis.    On mental status examination:  38-year-old white female whose affect was greater range.  Her speech was good articulation and execution.  Her thought processes this time were essentially linear and could be tracked.  Her thought content demonstrated no clear evidence of any visual hallucinations.  There was no clear evidence of any auditory hallucinations.  She denied any intention to self injure.  She made no statements of intention to harm others.  Her insight and judgment this time deemed to be limited to poor.  Orientation was intact.      Impression:  Suicide ideation resolved   Major depressive disorder recurrent moderate to severe without psychotic features   Rule out bipolar affective disorder secondary to chronic use of psychostimulants with mixed features  Stimulant use disorder  Alcohol use disorder     Estimated continued stay 24 hours     Indications: Patient this time appears to be stable stabilization.  Although her sleep cycle was not she does not appear to be showing significant levels of activation with current medication trials appears could be integrated into a lower level of  care     Recommendations:   1. We will go ahead and add Vistaril as needed for sleep and anxiety  2. We will continue trials of BuSpar 10 mg p.o. t.i.d..  3.  We will continue trials of Catapres 0.1 mg p.o. q.h.s..  4.  We will tentatively look towards discharge integration patient was back into outpatient care with the next 24 hours.

## 2024-03-13 NOTE — PROGRESS NOTES
Recreation Therapy Progress Note    Date:  03/13/2020    Time:  10:00 a.m.    Group Title:  Creative expression    Mood:  Patient less depressed and less anxious.    Behavior:  Patient participate in art and music class at her request.  The art and music helps her to relax and enhance self-esteem and self-worth.  As well as appropriate ways to express her emotions.    Affect:  Patient less anxious and less depressed.  Affect is a little brighter today than yesterday    Speech:  Patient is talking more in group    Cognition:  Alert and focused on assignments    Participation Level:  100% in group    Intervention:  Continue to provide recreational therapy services assist patient one-to-one as needed to reach her goals          Recreation Therapist   Signature:  Merlene ashleys

## 2024-03-13 NOTE — NURSING
"Daily Nursing Note:      Behavior:    Patient (Mili Baker is a 38 y.o. female, : 1985, MRN: [id number]) demonstrating an affect that was anxious. Mili demonstrating mood that is anxious and pleasant and appropriate. Mili had an appearance that was clean. Mili denies suicidal ideation. Mili denies suicide plan. Mili denies homicidal ideation. Mili denies hallucinations. Pt reports having trouble staying asleep secondary to being out of her normal routine.    Merricks  height is 4' 11" (1.499 m) and weight is 64.3 kg (141 lb 12.1 oz). Her temperature is 98.1 °F (36.7 °C). Her blood pressure is 105/56 (abnormal) and her pulse is 72. Her respiration is 20 and oxygen saturation is 99%.     Merricks last BM was noted on: 3/12/24    Intervention:    Encourage Mili to perform self-hygiene, grooming, and changing of clothing. Monitor Merricks behavior and program compliance. Monitor Mili for suicidal ideation, homicidal ideation, sleep disturbance, and hallucinations. Encourage Mili to eat all portions of meals and assess for meal preferences. Monitor Mili for intake and output to ensure hydration. Notify the Physician for any medication refusal and any change in patient condition.      Response:    Mili verbalizes understand of unit process and procedures. Mili  is compliant with medications, no adverse effects observed or reported.Plans are for discharge in the morning.      Plan:     Continue to monitor per MD orders; maintain patient safety.   "

## 2024-03-13 NOTE — PROGRESS NOTES
Recreation Therapy Progress Note    Date:  03/13/2024    Time:  1400    Group Title:  Leisure skills    Mood:  Hopeful and excited for discharge for tomorrow morning    Behavior:  Alert and participated and has brighter affect    Affect:  Brighter affect today patient less depressed and less anxious    Speech:  Patient talking more in group    Cognition:  Patient able to focused on cognitive activity    Participation Level:  100%    Intervention:  Patient completed all goals objectives for recreational therapy services on treatment plan.  Patient will be discharged on tomorrow.  Patient is excited about the goals that she has achieved to be successful in life wants discharge and at home          Recreation Therapist   Signature:  Merlene HEARTS

## 2024-03-14 VITALS
OXYGEN SATURATION: 98 % | DIASTOLIC BLOOD PRESSURE: 62 MMHG | BODY MASS INDEX: 28.58 KG/M2 | HEIGHT: 59 IN | RESPIRATION RATE: 16 BRPM | TEMPERATURE: 99 F | SYSTOLIC BLOOD PRESSURE: 118 MMHG | WEIGHT: 141.75 LBS | HEART RATE: 73 BPM

## 2024-03-14 PROCEDURE — 25000003 PHARM REV CODE 250: Performed by: PSYCHIATRY & NEUROLOGY

## 2024-03-14 PROCEDURE — S4991 NICOTINE PATCH NONLEGEND: HCPCS | Performed by: PSYCHIATRY & NEUROLOGY

## 2024-03-14 RX ADMIN — NICOTINE 1 PATCH: 21 PATCH, EXTENDED RELEASE TRANSDERMAL at 08:03

## 2024-03-14 RX ADMIN — ACETAMINOPHEN 650 MG: 325 TABLET, FILM COATED ORAL at 08:03

## 2024-03-14 RX ADMIN — BUSPIRONE HYDROCHLORIDE 10 MG: 10 TABLET ORAL at 08:03

## 2024-03-14 RX ADMIN — FLUTICASONE PROPIONATE 50 MCG: 50 SPRAY, METERED NASAL at 08:03

## 2024-03-14 RX ADMIN — IBUPROFEN 400 MG: 400 TABLET, FILM COATED ORAL at 01:03

## 2024-03-14 RX ADMIN — BUPROPION HYDROCHLORIDE 150 MG: 150 TABLET, FILM COATED, EXTENDED RELEASE ORAL at 08:03

## 2024-03-14 NOTE — HOSPITAL COURSE
Date of discharge:  03/14/2024.      38-year-old white female with a history of previous inpatient psychiatric treatment who at this time presents from area emergency room after she threatened to self injure.  Patient was apparently had also gestures to self-harm by taken overdose of prescriptive medicines.  Patient was admitted to inpatient setting such underwent full physical assessment.  On physical evaluation patient was found to have no acute changes physical findings.  Patient was physical assessment showed no abnormalities on her CBC differential CMP.  Her TSH was in normal limits.  Her urinalysis was significant for what appeared to be contamination.  Her urine toxicology screen with the presence of fentanyl appeared to be cross contamination.    Patient was full psychiatric assessment this time.  Patient shows no problem list as characterized by  1. Thoughts to self injure  2.  Mood disturbance with possible cyclic component of predominant symptoms dysphoria at this time  3.  History inattentiveness   4.  Abuse of psychoactive chemicals including psychostimulants and alcohol.  Patient was currently relapsed.      Patient had this gestures to self injurious social appeared to resolve relatively quickly once he knew she was getting help and stabilization.  Patient was evaluated for trials of medications.  Recommendations made to switch her over to trials of Wellbutrin.  She was placed on trials of Wellbutrin  mg p.o. q.day. Wellbutrin was chosen as patient had previously complained of marked difficulties with inattentiveness and inability to focus.  Hopes were that we can move away from use of Strattera and could use the Wellbutrin as her primary to for management.    Patient comply with the medication trials and had no difficulties.  Patient was also placed on trials of clonidine at hours sleep try to mitigate difficulty in terms of sleeplessness and maladaptive dream material.  Patient was past taken  trazodone but it was felt that trazodone could potentiate risk for exacerbating vivid dreams and as a result was discontinued.  Patient was sleep adequately well with trials of clonidine but did occasionally require Vistaril to assist in terms of management.      Patient made slow and steady progression during the course of hospitalization.  She made confined to the inpatient setting for duration of 4 days.   did reach out to sober living home those confirm that she can return there to live.    Patient was showed complete resolution of thoughts to self injure her mood was far more stabilized such was felt after being confined for duration of 4 days she could safely be discharged to outpatient services.  Patient was discharged to home in stable condition.  Patient was given strong encouragement to increase number AA visit/reading she was making each week.  She was encouraged to make mentally 3 of not 1 daily.

## 2024-03-14 NOTE — NURSING
Discharge Note:    Mili Baker is a 38 y.o. female, : 1985, MRN: [id number], admitted on 3/10/2024 for Shiva Patterson MD with a diagnosis of MDD (major depressive disorder) [F32.9].    Patient discharged home (sober living) via medicaid transport in stable condition. Patient denied suicidal ideation, homicidal ideation, or hallucinations.  Patient medication compliant with no c/o adverse effects.   Patient was discharged with valuables, personal belongings, prescriptions, discharge instructions, and an educational handout explaining the diagnosis and prescribed medications. Patient verbalized understanding of the discharge instructions and importance of follow-up visits.    Patient discharged on the following medications:     Medication List        START taking these medications      buPROPion 150 MG TB24 tablet  Commonly known as: WELLBUTRIN XL  Take 1 tablet (150 mg total) by mouth once daily.     cloNIDine 0.1 MG tablet  Commonly known as: CATAPRES  Take 1 tablet (0.1 mg total) by mouth nightly.            STOP taking these medications      atomoxetine 40 MG capsule  Commonly known as: STRATTERA     busPIRone 7.5 MG tablet  Commonly known as: BUSPAR     citalopram 20 MG tablet  Commonly known as: CeleXA     fluticasone propionate 50 mcg/actuation nasal spray  Commonly known as: FLONASE     traZODone 100 MG tablet  Commonly known as: DESYREL               Where to Get Your Medications        These medications [id number] [place] [place] [facility] [address] AT [address] [address]      Phone: [phone]   buPROPion 150 MG TB24 tablet  cloNIDine 0.1 MG tablet

## 2024-03-14 NOTE — DISCHARGE SUMMARY
[facility]  Psychiatry  Discharge Summary      Patient Name: Mili Baker  MRN: [id number]  Admission Date: 3/10/2024  Hospital Length of Stay: 4 days  Discharge Date and Time: No discharge date for patient encounter.  Attending Physician: Shiva Patterson MD   Discharging Provider: Shiva Patterson MD  Primary Care Provider: Vangie King FNP    HPI:   38-year-old white female who at this time was admitted to inpatient services here at [facility].  Patient was admitted after she presents with a history of relapsed with alcohol and gestures to self injure by mixed overdose while at sober living home.      Patient was apparently has been living in sober living for the past 5 months.  She states since living there she has done fairly well however most recently she was states there has been increased conflict in the home other women in his led her to the point that she just did not want to go on.  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  She states has a result is become increasingly more stressful overwhelming to her.    Patient was this time describes a history of ongoing difficulties in terms of depression mood particularly most recently has been started on trials of Celexa.  She also has Strattera for complaints of inattentiveness    Patient was evaluated at this time overall appropriate utilization of trials of medications patient was presents overall symptom complex at this time is characterized by the followin. Difficulty sleeping with most recent faces 100 mg of trazodone   2. feelings of low self-worth self-esteem   3. Feelings of hopelessness   .  Poor concentration  5.  Persistent dysphoria  6. Thoughts to self-harm with a he was despair and hopelessness stating that she just can not go on patient this time presents with a history of gesture.  7. Low  energy levels   8. Increased desire to isolate and withdrawal.    Patient does have a long established history of use of substances going back to age 17.  Patient reports her 1st she was substance was cannabis and alcohol.  She states she has used throughout her adult life.  Patient this time reports a history of most recent usage of methamphetamines.  Earlier in life she did use opiate compounds her most recent relapsed was focused around alcohol.    Patient denies any previous attempts to harm self.  She does have a courses presentation.    When questioned about issues of trauma earlier in life she denies.    When questioned about history of engaging into restless assaultive behavior towards others she denies.      Hospital Course:   Date of discharge:  03/14/2024.      38-year-old white female with a history of previous inpatient psychiatric treatment who at this time presents from area emergency room after she threatened to self injure.  Patient was apparently had also gestures to self-harm by taken overdose of prescriptive medicines.  Patient was admitted to inpatient setting such underwent full physical assessment.  On physical evaluation patient was found to have no acute changes physical findings.  Patient was physical assessment showed no abnormalities on her CBC differential CMP.  Her TSH was in normal limits.  Her urinalysis was significant for what appeared to be contamination.  Her urine toxicology screen with the presence of fentanyl appeared to be cross contamination.    Patient was full psychiatric assessment this time.  Patient shows no problem list as characterized by  1. Thoughts to self injure  2.  Mood disturbance with possible cyclic component of predominant symptoms dysphoria at this time  3.  History inattentiveness   4.  Abuse of psychoactive chemicals including psychostimulants and alcohol.  Patient was currently relapsed.      Patient had this gestures to self injurious social appeared to  resolve relatively quickly once he knew she was getting help and stabilization.  Patient was evaluated for trials of medications.  Recommendations made to switch her over to trials of Wellbutrin.  She was placed on trials of Wellbutrin  mg p.o. q.day. Wellbutrin was chosen as patient had previously complained of marked difficulties with inattentiveness and inability to focus.  Hopes were that we can move away from use of Strattera and could use the Wellbutrin as her primary to for management.    Patient comply with the medication trials and had no difficulties.  Patient was also placed on trials of clonidine at hours sleep try to mitigate difficulty in terms of sleeplessness and maladaptive dream material.  Patient was past taken trazodone but it was felt that trazodone could potentiate risk for exacerbating vivid dreams and as a result was discontinued.  Patient was sleep adequately well with trials of clonidine but did occasionally require Vistaril to assist in terms of management.      Patient made slow and steady progression during the course of hospitalization.  She made confined to the inpatient setting for duration of 4 days.   did reach out to sober living home those confirm that she can return there to live.    Patient was showed complete resolution of thoughts to self injure her mood was far more stabilized such was felt after being confined for duration of 4 days she could safely be discharged to outpatient services.  Patient was discharged to home in stable condition.  Patient was given strong encouragement to increase number AA visit/reading she was making each week.  She was encouraged to make mentally 3 of not 1 daily.       Goals of Care Treatment Preferences:  Code Status: Full Code      * No surgery found *     Consults:   Consults (From admission, onward)          Status Ordering Provider     Inpatient consult to Hospital Medicine  Once        Provider:  Jesse Perdue  EVIE Lundy          Physical Exam    Mental status examination:  38-year-old white female who has a much broader range affect.  Whose speech was with good articulation and execution.  Her thought processes this time were linear and able to be tracked.  Her thought content demonstrated no clear evidence of any auditory or visual hallucinations.  Patient was making no active statements harm self.  Patient made no active statements to harm others.  She verbalized more for the future.  There did not appear to be paranoia or delusions this time.  Her insight and judgment deemed to be adequate for discharge.  Orientation was intact.     Significant Labs: Last 72 Hours:   Recent Lab Results       None            Significant Imaging: None    Smoking Cessation:   Does the patient smoke? Yes  Does the patient want a prescription for Smoking Cessation? No  Does the patient want counseling for Smoking Cessation? No         Pending Diagnostic Studies:       None          Final Active Diagnoses:    Diagnosis Date Noted POA    PRINCIPAL PROBLEM:  Current severe episode of major depressive disorder without psychotic features without prior episode [F32.2] 03/10/2024 Yes    Suicidal ideation [R45.851] 03/10/2024 Not Applicable    Methamphetamine use disorder, severe [F15.20] 03/10/2024 No    Tobacco use disorder, moderate, dependence [F17.200] 01/30/2024 Yes      Problems Resolved During this Admission:      No new Assessment & Plan notes have been filed under this hospital service since the last note was generated.  Service: Psychiatry      Functional Condition: Independent ambulation, Independent communication skills, Can read/write, and Independent with ADLs and basic life skills    Discharged Condition: fair    Disposition: Home or Self Care    Follow Up:   Follow-up Information       [facility] Follow up on 3/18/2024.    Why: 2:45 pm  Contact information:  [address] [place] [address] [address]  [place] [address]  [phone]                         Patient Instructions:   No discharge procedures on file.  Medications:  Reconciled Home Medications:      Medication List        START taking these medications      buPROPion 150 MG TB24 tablet  Commonly known as: WELLBUTRIN XL  Take 1 tablet (150 mg total) by mouth once daily.     cloNIDine 0.1 MG tablet  Commonly known as: CATAPRES  Take 1 tablet (0.1 mg total) by mouth nightly.            STOP taking these medications      atomoxetine 40 MG capsule  Commonly known as: STRATTERA     busPIRone 7.5 MG tablet  Commonly known as: BUSPAR     citalopram 20 MG tablet  Commonly known as: CeleXA     fluticasone propionate 50 mcg/actuation nasal spray  Commonly known as: FLONASE     traZODone 100 MG tablet  Commonly known as: DESYREL            Is patient being discharged on multiple antipsychotics? No        Total time:30 with greater than 50% of this time spent in counseling and/or coordination of care.     All elements of the transition record were discussed with the patient at discharge and patient agrees to this plan.    Shiva Patterson MD  Psychiatry  [facility]

## 2024-03-14 NOTE — NURSING
"Daily Nursing Note:      Behavior:    Patient (Mili Baker is a 38 y.o. female, : 1985, MRN: [id number]) demonstrating an affect that was anxious. Mili demonstrating mood that is anxious. Mili had an appearance that was disheveled. Mili denies suicidal ideation. Mili denies suicide plan. Mili denies homicidal ideation. Mili denies hallucinations. She ate 50/100/100% of her meals today.    Merricks  height is 4' 11" (1.499 m) and weight is 64.3 kg (141 lb 12.1 oz). Her temperature is 98.5 °F (36.9 °C). Her blood pressure is 118/54 (abnormal) and her pulse is 66. Her respiration is 18 and oxygen saturation is 98%.     Merricks last BM was noted on: 3/13/24      Intervention:    Encourage Mili to perform self-hygiene, grooming, and changing of clothing. Monitor Merricks behavior and program compliance. Monitor Mili for suicidal ideation, homicidal ideation, sleep disturbance, and hallucinations. Encourage Mili to eat all portions of meals and assess for meal preferences. Monitor Mili for intake and output to ensure hydration. Notify the Physician (MD) for any medication refusal and any change in patient condition.      Response:    Mili verbalizes understand of unit process and procedures. Mili is compliant with her medications.      Plan:     Continue to monitor per MD orders; maintain patient safety.    Q15min safety checks in progress.   "

## 2024-03-14 NOTE — PLAN OF CARE
Problem: Adult Behavioral Health Plan of Care  Goal: Develops/Participates in Therapeutic [place] to Support Successful Transition  Outcome: Ongoing, Progressing  Intervention: Foster Therapeutic [place]  Flowsheets (Taken 3/14/2024 0400)  Trust Relationship/Rapport:   care explained   choices provided   emotional support provided   empathic listening provided   questions answered   questions encouraged   reassurance provided   thoughts/feelings acknowledged  Goal: Rounds/Family Conference  Outcome: Ongoing, Progressing     Problem: Adult Behavioral Health Plan of Care  Goal: Plan of Care Review  Outcome: Met  Goal: Patient-Specific Goal (Individualization)  Outcome: Met  Goal: Adheres to Safety Considerations for Self and Others  Outcome: Met  Intervention: Develop and Maintain Individualized Safety Plan  Flowsheets (Taken 3/14/2024 0400)  Safety Measures:   clinical history reviewed   environmental rounds completed   safety plan reviewed   safety rounds completed   suicide assessment completed  Goal: Absence of New-Onset Illness or Injury  Outcome: Met  Intervention: Identify and Manage Fall Risk  Flowsheets (Taken 3/14/2024 0400)  Safety Measures:   clinical history reviewed   environmental rounds completed   safety plan reviewed   safety rounds completed   suicide assessment completed  Intervention: Prevent Infection  Flowsheets (Taken 3/14/2024 0400)  Infection Prevention: hand hygiene promoted  Goal: Optimized Coping Skills in Response to Life Stressors  Outcome: Met  Intervention: Promote Effective Coping Strategies  Flowsheets (Taken 3/14/2024 0400)  Supportive Measures:   active listening utilized   counseling provided   decision-making supported   goal-setting facilitated   positive reinforcement provided   problem-solving facilitated   relaxation techniques promoted   self-care encouraged   self-reflection promoted   self-responsibility promoted   verbalization of feelings encouraged     Problem:  Depression  Goal: Improved Mood  Outcome: Met  Intervention: Monitor and Manage Depressive Symptoms  Flowsheets (Taken 3/14/2024 0400)  Supportive Measures:   active listening utilized   counseling provided   decision-making supported   goal-setting facilitated   positive reinforcement provided   problem-solving facilitated   relaxation techniques promoted   self-care encouraged   self-reflection promoted   self-responsibility promoted   verbalization of feelings encouraged

## 2025-05-20 ENCOUNTER — HOSPITAL ENCOUNTER (EMERGENCY)
Facility: HOSPITAL | Age: 40
Discharge: PSYCHIATRIC OR SUBSTANCE USE FACILITY | End: 2025-05-20
Attending: EMERGENCY MEDICINE
Payer: MEDICAID

## 2025-05-20 VITALS
TEMPERATURE: 99 F | OXYGEN SATURATION: 98 % | WEIGHT: 160 LBS | SYSTOLIC BLOOD PRESSURE: 128 MMHG | DIASTOLIC BLOOD PRESSURE: 74 MMHG | BODY MASS INDEX: 29.44 KG/M2 | HEART RATE: 98 BPM | RESPIRATION RATE: 18 BRPM | HEIGHT: 62 IN

## 2025-05-20 DIAGNOSIS — F29 PSYCHOSIS, UNSPECIFIED PSYCHOSIS TYPE: ICD-10-CM

## 2025-05-20 DIAGNOSIS — F41.9 ANXIETY: ICD-10-CM

## 2025-05-20 DIAGNOSIS — Z00.8 MEDICAL CLEARANCE FOR PSYCHIATRIC ADMISSION: Primary | ICD-10-CM

## 2025-05-20 DIAGNOSIS — F31.12 BIPOLAR 1 DISORDER WITH MODERATE MANIA: ICD-10-CM

## 2025-05-20 LAB
ACCEPTIBLE SP GR UR QL: 1.01 (ref 1–1.03)
ALBUMIN SERPL-MCNC: 4.1 G/DL (ref 3.5–5)
ALBUMIN/GLOB SERPL: 1.2 RATIO (ref 1.1–2)
ALP SERPL-CCNC: 63 UNIT/L (ref 40–150)
ALT SERPL-CCNC: 32 UNIT/L (ref 0–55)
AMPHET UR QL SCN: NEGATIVE
ANION GAP SERPL CALC-SCNC: 10 MEQ/L
APAP SERPL-MCNC: <3 UG/ML (ref 10–30)
AST SERPL-CCNC: 27 UNIT/L (ref 11–45)
B-HCG UR QL: NEGATIVE
BACTERIA #/AREA URNS AUTO: ABNORMAL /HPF
BARBITURATE SCN PRESENT UR: NEGATIVE
BASOPHILS # BLD AUTO: 0.11 X10(3)/MCL
BASOPHILS NFR BLD AUTO: 0.9 %
BENZODIAZ UR QL SCN: NEGATIVE
BILIRUB SERPL-MCNC: 0.1 MG/DL
BILIRUB UR QL STRIP.AUTO: NEGATIVE
BUN SERPL-MCNC: 14.6 MG/DL (ref 7–18.7)
CALCIUM SERPL-MCNC: 9 MG/DL (ref 8.4–10.2)
CANNABINOIDS UR QL SCN: NEGATIVE
CHLORIDE SERPL-SCNC: 113 MMOL/L (ref 98–107)
CLARITY UR: CLEAR
CO2 SERPL-SCNC: 17 MMOL/L (ref 22–29)
COCAINE UR QL SCN: NEGATIVE
COLOR UR AUTO: ABNORMAL
CREAT SERPL-MCNC: 0.7 MG/DL (ref 0.55–1.02)
CREAT/UREA NIT SERPL: 21
CTP QC/QA: YES
EOSINOPHIL # BLD AUTO: 0.26 X10(3)/MCL (ref 0–0.9)
EOSINOPHIL NFR BLD AUTO: 2.2 %
ERYTHROCYTE [DISTWIDTH] IN BLOOD BY AUTOMATED COUNT: 12.1 % (ref 11.5–17)
ETHANOL SERPL-MCNC: <10 MG/DL
FENTANYL UR QL SCN: NEGATIVE
GFR SERPLBLD CREATININE-BSD FMLA CKD-EPI: >60 ML/MIN/1.73/M2
GLOBULIN SER-MCNC: 3.4 GM/DL (ref 2.4–3.5)
GLUCOSE SERPL-MCNC: 102 MG/DL (ref 74–100)
GLUCOSE UR QL STRIP: NORMAL
HCT VFR BLD AUTO: 37.1 % (ref 37–47)
HGB BLD-MCNC: 12.7 G/DL (ref 12–16)
HGB UR QL STRIP: NEGATIVE
HOLD SPECIMEN: NORMAL
HYALINE CASTS #/AREA URNS LPF: ABNORMAL /LPF
IMM GRANULOCYTES # BLD AUTO: 0.06 X10(3)/MCL (ref 0–0.04)
IMM GRANULOCYTES NFR BLD AUTO: 0.5 %
KETONES UR QL STRIP: NEGATIVE
LEUKOCYTE ESTERASE UR QL STRIP: NEGATIVE
LYMPHOCYTES # BLD AUTO: 2.51 X10(3)/MCL (ref 0.6–4.6)
LYMPHOCYTES NFR BLD AUTO: 21.3 %
MCH RBC QN AUTO: 30.9 PG (ref 27–31)
MCHC RBC AUTO-ENTMCNC: 34.2 G/DL (ref 33–36)
MCV RBC AUTO: 90.3 FL (ref 80–94)
MDMA UR QL SCN: NEGATIVE
MONOCYTES # BLD AUTO: 0.91 X10(3)/MCL (ref 0.1–1.3)
MONOCYTES NFR BLD AUTO: 7.7 %
MUCOUS THREADS URNS QL MICRO: ABNORMAL /LPF
NEUTROPHILS # BLD AUTO: 7.96 X10(3)/MCL (ref 2.1–9.2)
NEUTROPHILS NFR BLD AUTO: 67.4 %
NITRITE UR QL STRIP: NEGATIVE
NRBC BLD AUTO-RTO: 0 %
OPIATES UR QL SCN: NEGATIVE
PCP UR QL: NEGATIVE
PH UR STRIP: 6 [PH]
PH UR: 6 [PH] (ref 3–11)
PLATELET # BLD AUTO: 325 X10(3)/MCL (ref 130–400)
PMV BLD AUTO: 10.3 FL (ref 7.4–10.4)
POTASSIUM SERPL-SCNC: 3.6 MMOL/L (ref 3.5–5.1)
PROT SERPL-MCNC: 7.5 GM/DL (ref 6.4–8.3)
PROT UR QL STRIP: NEGATIVE
RBC # BLD AUTO: 4.11 X10(6)/MCL (ref 4.2–5.4)
RBC #/AREA URNS AUTO: ABNORMAL /HPF
SODIUM SERPL-SCNC: 140 MMOL/L (ref 136–145)
SP GR UR STRIP.AUTO: 1.01 (ref 1–1.03)
SQUAMOUS #/AREA URNS LPF: ABNORMAL /HPF
UROBILINOGEN UR STRIP-ACNC: NORMAL
WBC # BLD AUTO: 11.81 X10(3)/MCL (ref 4.5–11.5)
WBC #/AREA URNS AUTO: ABNORMAL /HPF

## 2025-05-20 PROCEDURE — 80053 COMPREHEN METABOLIC PANEL: CPT | Performed by: EMERGENCY MEDICINE

## 2025-05-20 PROCEDURE — 81001 URINALYSIS AUTO W/SCOPE: CPT | Performed by: EMERGENCY MEDICINE

## 2025-05-20 PROCEDURE — 81025 URINE PREGNANCY TEST: CPT | Performed by: EMERGENCY MEDICINE

## 2025-05-20 PROCEDURE — 82077 ASSAY SPEC XCP UR&BREATH IA: CPT | Performed by: EMERGENCY MEDICINE

## 2025-05-20 PROCEDURE — 80143 DRUG ASSAY ACETAMINOPHEN: CPT | Performed by: EMERGENCY MEDICINE

## 2025-05-20 PROCEDURE — 85025 COMPLETE CBC W/AUTO DIFF WBC: CPT | Performed by: EMERGENCY MEDICINE

## 2025-05-20 PROCEDURE — 80307 DRUG TEST PRSMV CHEM ANLYZR: CPT | Performed by: EMERGENCY MEDICINE

## 2025-05-20 PROCEDURE — 99285 EMERGENCY DEPT VISIT HI MDM: CPT

## 2025-05-21 NOTE — ED PROVIDER NOTES
Encounter Date: 5/20/2025       History     Chief Complaint   Patient presents with    Psychiatric Evaluation     Patient reports to the ed from [facility] secondary to psychiatric evaluation. Patient also states she only wants to take her melatonin and no other meds. History of bipolar. Vss. Nadn. Cleared by [facility] security upon arrival.     Patient presenting with EMS for evaluation from a rehab for paranoia, EMS states that patient was witnessed talking to several people that were not there, she feels people are out to get her.  Patient states there is nothing wrong with her she refuses to take her psych medication which she was discharged with several days ago.      The history is provided by the patient and the EMS personnel.     Review of patient's allergies indicates:  No Known Allergies  Past Medical History:   Diagnosis Date    Alcohol abuse     History of psychiatric hospitalization     Hx of psychiatric care      Past Surgical History:   Procedure Laterality Date    leg Left      Family History   Problem Relation Name Age of Onset    Kidney disease Mother      No Known Problems Father      Asthma Daughter      Asthma Son      No Known Problems Maternal Grandmother      No Known Problems Maternal Grandfather      No Known Problems Paternal Grandmother      No Known Problems Paternal Grandfather       Social History[1]  Review of Systems   Psychiatric/Behavioral:  Positive for agitation, dysphoric mood, hallucinations and sleep disturbance. The patient is nervous/anxious.    All other systems reviewed and are negative.      Physical Exam     Initial Vitals [05/20/25 1900]   BP Pulse Resp Temp SpO2   133/75 (!) 122 18 99.2 °F (37.3 °C) 98 %      MAP       --         Physical Exam    Vitals reviewed.  Constitutional: She appears well-developed and well-nourished.   Eyes: EOM are normal. Pupils are equal, round, and reactive to light.   Cardiovascular:  Normal rate and regular rhythm.            Pulmonary/Chest: Breath sounds normal.   Abdominal: Abdomen is soft. Bowel sounds are normal.     Neurological: She is alert and oriented to person, place, and time.   Skin: Skin is warm and dry.   Psychiatric:   Paranoid         ED Course   Procedures  Labs Reviewed   COMPREHENSIVE METABOLIC PANEL - Abnormal       Result Value    Sodium 140      Potassium 3.6      Chloride 113 (*)     CO2 17 (*)     Glucose 102 (*)     Blood Urea Nitrogen 14.6      Creatinine 0.70      Calcium 9.0      Protein Total 7.5      Albumin 4.1      Globulin 3.4      Albumin/Globulin Ratio 1.2      Bilirubin Total 0.1      ALP 63      ALT 32      AST 27      eGFR >60      Anion Gap 10.0      BUN/Creatinine Ratio 21     URINALYSIS, REFLEX TO URINE CULTURE - Abnormal    Color, UA Light-Yellow      Appearance, UA Clear      Specific Gravity, UA 1.011      pH, UA 6.0      Protein, UA Negative      Glucose, UA Normal      Ketones, UA Negative      Blood, UA Negative      Bilirubin, UA Negative      Urobilinogen, UA Normal      Nitrites, UA Negative      Leukocyte Esterase, UA Negative      RBC, UA 0-5      WBC, UA 0-5      Bacteria, UA Occasional (*)     Squamous Epithelial Cells, UA Many (*)     Mucous, UA Trace (*)     Hyaline Casts, UA None Seen     ACETAMINOPHEN LEVEL - Abnormal    Acetaminophen Level <3.0 (*)    CBC WITH DIFFERENTIAL - Abnormal    WBC 11.81 (*)     RBC 4.11 (*)     Hgb 12.7      Hct 37.1      MCV 90.3      MCH 30.9      MCHC 34.2      RDW 12.1      Platelet 325      MPV 10.3      Neut % 67.4      Lymph % 21.3      Mono % 7.7      Eos % 2.2      Basophil % 0.9      Imm Grans % 0.5      Neut # 7.96      Lymph # 2.51      Mono # 0.91      Eos # 0.26      Baso # 0.11      Imm Gran # 0.06 (*)     NRBC% 0.0     DRUG SCREEN, URINE (BEAKER) - Normal    Amphetamines, Urine Negative      Barbiturates, Urine Negative      Benzodiazepine, Urine Negative      Cannabinoids, Urine Negative      Cocaine, Urine Negative      Fentanyl,  Urine Negative      MDMA, Urine Negative      Opiates, Urine Negative      Phencyclidine, Urine Negative      pH, Urine 6.0      Specific Gravity, Urine Auto 1.011      Narrative:     Cut off concentrations:    Amphetamines - 1000 ng/ml  Barbiturates - 200 ng/ml  Benzodiazepine - 200 ng/ml  Cannabinoids (THC) - 50 ng/ml  Cocaine - 300 ng/ml  Fentanyl - 1.0 ng/ml  MDMA - 500 ng/ml  Opiates - 300 ng/ml   Phencyclidine (PCP) - 25 ng/ml    Specimen submitted for drug analysis and tested for pH and specific gravity in order to evaluate sample integrity. Suspect tampering if specific gravity is <1.003 and/or pH is not within the range of 4.5 - 8.0  False negatives may result form substances such as bleach added to urine.  False positives may result for the presence of a substance with similar chemical structure to the drug or its metabolite.    This test provides only a PRELIMINARY analytical test result. A more specific alternate chemical method must be used in order to obtain a confirmed analytical result. Gas chromatography/mass spectrometry (GC/MS) is the preferred confirmatory method. Other chemical confirmation methods are available. Clinical consideration and professional judgement should be applied to any drug of abuse test result, particularly when preliminary positive results are used.    Positive results will be confirmed only at the physicians request. Unconfirmed screening results are to be used only for medical purposes (treatment).        ALCOHOL,MEDICAL (ETHANOL) - Normal    Ethanol Level <10.0     CBC W/ AUTO DIFFERENTIAL    Narrative:     The following orders were created for panel order CBC auto differential.  Procedure                               Abnormality         Status                     ---------                               -----------         ------                     CBC with Differential[[id number]]       Abnormal            Final result                 Please view results for these tests  on the individual orders.   EXTRA TUBES    Narrative:     The following orders were created for panel order EXTRA TUBES.  Procedure                               Abnormality         Status                     ---------                               -----------         ------                     Gold Top Hold[[id number]]                                   In process                   Please view results for these tests on the individual orders.   GOLD TOP HOLD   POCT URINE PREGNANCY    POC Preg Test, Ur Negative       Acceptable Yes            Imaging Results    None          Medications - No data to display  Medical Decision Making  Amount and/or Complexity of Data Reviewed  Labs: ordered.                                      Clinical Impression:  Final diagnoses:  [Z00.8] Medical clearance for psychiatric admission (Primary)  [F29] Psychosis, unspecified psychosis type  [F41.9] Anxiety  [F31.12] Bipolar 1 disorder with moderate walter          ED Disposition Condition    Transfer to Psych Facility Stable          ED Prescriptions    None       Follow-up Information    None                [1]   Social History  Tobacco Use    Smoking status: Every Day     Current packs/day: 0.50     Average packs/day: 0.5 packs/day for 21.4 years (10.7 ttl pk-yrs)     Types: Cigarettes     Start date: 2004    Smokeless tobacco: Never   Substance Use Topics    Alcohol use: Not Currently    Drug use: Yes     Types: Methamphetamines     Comment: Meth in past        Aleksandar Valdes MD  05/20/25 2015

## 2025-07-27 ENCOUNTER — OFFICE VISIT (OUTPATIENT)
Dept: URGENT CARE | Facility: CLINIC | Age: 40
End: 2025-07-27
Payer: MEDICAID

## 2025-07-27 VITALS
HEART RATE: 68 BPM | HEIGHT: 60 IN | BODY MASS INDEX: 32.36 KG/M2 | TEMPERATURE: 99 F | WEIGHT: 164.81 LBS | DIASTOLIC BLOOD PRESSURE: 73 MMHG | OXYGEN SATURATION: 98 % | SYSTOLIC BLOOD PRESSURE: 108 MMHG | RESPIRATION RATE: 18 BRPM

## 2025-07-27 DIAGNOSIS — R09.89 SYMPTOMS OF UPPER RESPIRATORY INFECTION (URI): Primary | ICD-10-CM

## 2025-07-27 DIAGNOSIS — J06.9 UPPER RESPIRATORY TRACT INFECTION, UNSPECIFIED TYPE: ICD-10-CM

## 2025-07-27 DIAGNOSIS — J34.89 RHINORRHEA: ICD-10-CM

## 2025-07-27 DIAGNOSIS — R05.1 ACUTE COUGH: ICD-10-CM

## 2025-07-27 LAB
CTP QC/QA: YES
SARS-COV+SARS-COV-2 AG RESP QL IA.RAPID: NEGATIVE

## 2025-07-27 PROCEDURE — 99215 OFFICE O/P EST HI 40 MIN: CPT | Mod: PBBFAC

## 2025-07-27 PROCEDURE — 99213 OFFICE O/P EST LOW 20 MIN: CPT | Mod: S$PBB,,,

## 2025-07-27 PROCEDURE — 87811 SARS-COV-2 COVID19 W/OPTIC: CPT | Mod: PBBFAC

## 2025-07-27 RX ORDER — NALTREXONE HYDROCHLORIDE 50 MG/1
50 TABLET, FILM COATED ORAL
COMMUNITY
Start: 2025-07-15

## 2025-07-27 RX ORDER — HYDROXYZINE PAMOATE 50 MG/1
50 CAPSULE ORAL DAILY PRN
COMMUNITY
Start: 2025-07-15

## 2025-07-27 RX ORDER — DEXBROMPHENIRAMINE MALEATE, PHENYLEPHRINE HYDROCHLORIDE 2; 7.5 MG/1; MG/1
2-7.5 TABLET ORAL 4 TIMES DAILY PRN
Qty: 28 TABLET | Refills: 0 | Status: SHIPPED | OUTPATIENT
Start: 2025-07-27 | End: 2025-08-03

## 2025-07-27 RX ORDER — DIVALPROEX SODIUM 500 MG/1
TABLET, FILM COATED, EXTENDED RELEASE ORAL
COMMUNITY
Start: 2025-07-15

## 2025-07-27 RX ORDER — MELATONIN 5 MG
CAPSULE ORAL
COMMUNITY

## 2025-07-27 RX ORDER — PROMETHAZINE HYDROCHLORIDE AND DEXTROMETHORPHAN HYDROBROMIDE 6.25; 15 MG/5ML; MG/5ML
5 SYRUP ORAL EVERY 4 HOURS PRN
Qty: 118 ML | Refills: 0 | Status: SHIPPED | OUTPATIENT
Start: 2025-07-27 | End: 2025-08-06

## 2025-07-27 RX ORDER — HALOPERIDOL 2 MG/1
2 TABLET ORAL 2 TIMES DAILY
COMMUNITY
Start: 2025-07-15

## 2025-07-27 RX ORDER — IBUPROFEN 600 MG/1
600 TABLET, FILM COATED ORAL EVERY 6 HOURS PRN
Qty: 15 TABLET | Refills: 0 | Status: SHIPPED | OUTPATIENT
Start: 2025-07-27

## 2025-07-27 NOTE — LETTER
July 27, 2025      [facility]  [address] [address]  Phone: [phone]       Patient: Mili Baker   YOB: 1985  Date of Visit: 07/27/2025    To Whom It May Concern:    Jasiel Baker  was at [facility] on 07/27/2025. The patient may return to work/school on 07/29/2025 with no restrictions. If you have any questions or concerns, or if I can be of further assistance, please do not hesitate to contact me.    Sincerely,    NIKOLAI Becerril

## 2025-07-27 NOTE — PROGRESS NOTES
Subjective:       Patient ID: Mili Baker is a 40 y.o. female.    Vitals:  height is 5' (1.524 m) and weight is 74.8 kg (164 lb 12.8 oz). Her temperature is 98.6 °F (37 °C). Her blood pressure is 108/73 and her pulse is 68. Her respiration is 18 and oxygen saturation is 98%.     Chief Complaint: URI (HA, cough, congestion, runny nose since yesterday. )    40-year-old female reports to the clinic with complaints of headache, cough, congestion, and rhinorrhea that began yesterday.  Patient states she has not taken any medication for this current illness and states that symptoms seem to be worsening and states that she woke up this morning with body aches as well.  Patient denies fever but states that she did wake up in the middle of the night sweating last night.    All other systems are negative    Chart reviewed    Objective:   Physical Exam   Constitutional: She appears well-developed.  Non-toxic appearance. She does not appear ill. No distress.   HENT:   Head: Normocephalic and atraumatic.   Ears:   Right Ear: Hearing, tympanic membrane, external ear and ear canal normal.   Left Ear: Hearing, tympanic membrane, external ear and ear canal normal.   Nose: Mucosal edema and rhinorrhea present. No purulent discharge. Right sinus exhibits no maxillary sinus tenderness and no frontal sinus tenderness. Left sinus exhibits no maxillary sinus tenderness and no frontal sinus tenderness.   Mouth/Throat: Uvula is midline. Oropharyngeal exudate (Postnasal drip present), posterior oropharyngeal edema and posterior oropharyngeal erythema present.   Eyes: Right eye exhibits no discharge. Left eye exhibits no discharge. Extraocular movement intact   Neck: Neck supple. No neck rigidity present.   Cardiovascular: Regular rhythm.   Pulmonary/Chest: Effort normal and breath sounds normal. No respiratory distress. She has no wheezes. She has no rhonchi. She has no rales.   Lymphadenopathy:     She has no cervical adenopathy.    Neurological: She is alert.   Skin: Skin is warm, dry and not diaphoretic.   Psychiatric: Her behavior is normal.   Nursing note and vitals reviewed.        Assessment:     1. Symptoms of upper respiratory infection (URI)    2. Upper respiratory tract infection, unspecified type    3. Acute cough    4. Rhinorrhea          Results for orders placed or performed in visit on 07/27/25   SARS Coronavirus 2 Antigen, POCT Manual Read    Collection Time: 07/27/25  1:49 PM   Result Value Ref Range    SARS Coronavirus 2 Antigen Negative Negative, Presumptive Negative     Acceptable Yes        Plan:     Discussed negative COVID results with patient  Begin taking Deirdre his as needed for congestion and promethazine DM as needed for cough  Begin taking ibuprofen as needed for headache and fever  We will give work excuse for today and tomorrow  Please read all educational materials and discharge instructions carefully  Follow-up with primary care provider as instructed   Discussed signs and symptoms of worsening illness that would warrant further evaluation in the emergency department:  Patient verbalized understanding of these instructions   Symptoms of upper respiratory infection (URI)  -     SARS Coronavirus 2 Antigen, POCT Manual Read    Upper respiratory tract infection, unspecified type  -     promethazine-dextromethorphan (PROMETHAZINE-DM) 6.25-15 mg/5 mL Syrp; Take 5 mLs by mouth every 4 (four) hours as needed (cough).  Dispense: 118 mL; Refill: 0  -     dexbrompheniramine-phenyleph (ALAHIST PE) 2-7.5 mg Tab; Take 2-7.5 mg by mouth 4 (four) times daily as needed (congestion).  Dispense: 28 tablet; Refill: 0  -     ibuprofen (ADVIL,MOTRIN) 600 MG tablet; Take 1 tablet (600 mg total) by mouth every 6 (six) hours as needed for Pain.  Dispense: 15 tablet; Refill: 0    Acute cough    Rhinorrhea        Please note: This chart was completed via voice to text dictation. It may contain typographical/word recognition  errors. If there are any questions, please contact the provider for final clarification.